# Patient Record
Sex: FEMALE | Race: WHITE | NOT HISPANIC OR LATINO | Employment: UNEMPLOYED | URBAN - NONMETROPOLITAN AREA
[De-identification: names, ages, dates, MRNs, and addresses within clinical notes are randomized per-mention and may not be internally consistent; named-entity substitution may affect disease eponyms.]

---

## 2018-02-02 ENCOUNTER — HOSPITAL ENCOUNTER (EMERGENCY)
Facility: HOSPITAL | Age: 18
Discharge: STILL ADMITTED OR ADMINISTRATIVE | End: 2018-02-03
Attending: FAMILY MEDICINE

## 2018-02-02 DIAGNOSIS — R45.851 SUICIDAL IDEATION: ICD-10-CM

## 2018-02-02 DIAGNOSIS — F32.1 MODERATE SINGLE CURRENT EPISODE OF MAJOR DEPRESSIVE DISORDER (HCC): Primary | ICD-10-CM

## 2018-02-02 LAB
6-ACETYL MORPHINE: NEGATIVE
ALBUMIN SERPL-MCNC: 5 G/DL (ref 3.5–5)
ALBUMIN/GLOB SERPL: 1.4 G/DL (ref 1.5–2.5)
ALP SERPL-CCNC: 105 U/L (ref 35–104)
ALT SERPL W P-5'-P-CCNC: 39 U/L (ref 10–36)
AMPHET+METHAMPHET UR QL: NEGATIVE
ANION GAP SERPL CALCULATED.3IONS-SCNC: 8 MMOL/L (ref 3.6–11.2)
AST SERPL-CCNC: 33 U/L (ref 10–30)
B-HCG UR QL: NEGATIVE
BARBITURATES UR QL SCN: NEGATIVE
BASOPHILS # BLD AUTO: 0.01 10*3/MM3 (ref 0–0.3)
BASOPHILS NFR BLD AUTO: 0.1 % (ref 0–2)
BENZODIAZ UR QL SCN: NEGATIVE
BILIRUB SERPL-MCNC: 0.3 MG/DL (ref 0.2–1.8)
BILIRUB UR QL STRIP: NEGATIVE
BUN BLD-MCNC: 13 MG/DL (ref 7–21)
BUN/CREAT SERPL: 15.1 (ref 7–25)
BUPRENORPHINE SERPL-MCNC: NEGATIVE NG/ML
CALCIUM SPEC-SCNC: 9.5 MG/DL (ref 7.7–10)
CANNABINOIDS SERPL QL: NEGATIVE
CHLORIDE SERPL-SCNC: 104 MMOL/L (ref 99–112)
CLARITY UR: CLEAR
CO2 SERPL-SCNC: 26 MMOL/L (ref 24.3–31.9)
COCAINE UR QL: NEGATIVE
COLOR UR: ABNORMAL
CREAT BLD-MCNC: 0.86 MG/DL (ref 0.43–1.29)
DEPRECATED RDW RBC AUTO: 43.9 FL (ref 37–54)
EOSINOPHIL # BLD AUTO: 0.06 10*3/MM3 (ref 0–0.7)
EOSINOPHIL NFR BLD AUTO: 0.5 % (ref 0–5)
ERYTHROCYTE [DISTWIDTH] IN BLOOD BY AUTOMATED COUNT: 13.8 % (ref 11.5–14.5)
ETHANOL BLD-MCNC: <10 MG/DL
ETHANOL UR QL: <0.01 %
GFR SERPL CREATININE-BSD FRML MDRD: 85 ML/MIN/1.73
GLOBULIN UR ELPH-MCNC: 3.6 GM/DL
GLUCOSE BLD-MCNC: 91 MG/DL (ref 70–110)
GLUCOSE UR STRIP-MCNC: NEGATIVE MG/DL
HCT VFR BLD AUTO: 41.7 % (ref 37–47)
HGB BLD-MCNC: 13.5 G/DL (ref 12–16)
HGB UR QL STRIP.AUTO: NEGATIVE
IMM GRANULOCYTES # BLD: 0.02 10*3/MM3 (ref 0–0.03)
IMM GRANULOCYTES NFR BLD: 0.2 % (ref 0–0.5)
KETONES UR QL STRIP: ABNORMAL
LEUKOCYTE ESTERASE UR QL STRIP.AUTO: NEGATIVE
LYMPHOCYTES # BLD AUTO: 2.04 10*3/MM3 (ref 1–3)
LYMPHOCYTES NFR BLD AUTO: 18.6 % (ref 21–51)
MCH RBC QN AUTO: 28.1 PG (ref 27–33)
MCHC RBC AUTO-ENTMCNC: 32.4 G/DL (ref 33–37)
MCV RBC AUTO: 86.9 FL (ref 80–94)
METHADONE UR QL SCN: NEGATIVE
MONOCYTES # BLD AUTO: 0.37 10*3/MM3 (ref 0.1–0.9)
MONOCYTES NFR BLD AUTO: 3.4 % (ref 0–10)
NEUTROPHILS # BLD AUTO: 8.45 10*3/MM3 (ref 1.4–6.5)
NEUTROPHILS NFR BLD AUTO: 77.2 % (ref 30–70)
NITRITE UR QL STRIP: NEGATIVE
OPIATES UR QL: NEGATIVE
OSMOLALITY SERPL CALC.SUM OF ELEC: 275.4 MOSM/KG (ref 273–305)
OXYCODONE UR QL SCN: NEGATIVE
PCP UR QL SCN: NEGATIVE
PH UR STRIP.AUTO: <=5 [PH] (ref 5–8)
PLATELET # BLD AUTO: 334 10*3/MM3 (ref 130–400)
PMV BLD AUTO: 9.7 FL (ref 6–10)
POTASSIUM BLD-SCNC: 4.1 MMOL/L (ref 3.5–5.3)
PROT SERPL-MCNC: 8.6 G/DL (ref 6–8)
PROT UR QL STRIP: NEGATIVE
RBC # BLD AUTO: 4.8 10*6/MM3 (ref 4.2–5.4)
SODIUM BLD-SCNC: 138 MMOL/L (ref 135–153)
SP GR UR STRIP: 1.02 (ref 1–1.03)
UROBILINOGEN UR QL STRIP: ABNORMAL
WBC NRBC COR # BLD: 10.95 10*3/MM3 (ref 4.5–12.5)

## 2018-02-02 PROCEDURE — 80307 DRUG TEST PRSMV CHEM ANLYZR: CPT | Performed by: NURSE PRACTITIONER

## 2018-02-02 PROCEDURE — 85025 COMPLETE CBC W/AUTO DIFF WBC: CPT | Performed by: NURSE PRACTITIONER

## 2018-02-02 PROCEDURE — 80053 COMPREHEN METABOLIC PANEL: CPT | Performed by: NURSE PRACTITIONER

## 2018-02-02 PROCEDURE — 81003 URINALYSIS AUTO W/O SCOPE: CPT | Performed by: NURSE PRACTITIONER

## 2018-02-02 PROCEDURE — 81025 URINE PREGNANCY TEST: CPT | Performed by: NURSE PRACTITIONER

## 2018-02-03 ENCOUNTER — HOSPITAL ENCOUNTER (INPATIENT)
Facility: HOSPITAL | Age: 18
LOS: 3 days | Discharge: HOME | End: 2018-02-06
Attending: PSYCHIATRY & NEUROLOGY | Admitting: PSYCHIATRY & NEUROLOGY

## 2018-02-03 VITALS
BODY MASS INDEX: 42.25 KG/M2 | HEART RATE: 94 BPM | HEIGHT: 59 IN | DIASTOLIC BLOOD PRESSURE: 90 MMHG | WEIGHT: 209.6 LBS | SYSTOLIC BLOOD PRESSURE: 144 MMHG | OXYGEN SATURATION: 100 % | TEMPERATURE: 98.6 F | RESPIRATION RATE: 18 BRPM

## 2018-02-03 PROBLEM — F32.A DEPRESSION WITH SUICIDAL IDEATION: Status: ACTIVE | Noted: 2018-02-03

## 2018-02-03 PROBLEM — R45.851 DEPRESSION WITH SUICIDAL IDEATION: Status: ACTIVE | Noted: 2018-02-03

## 2018-02-03 PROCEDURE — 93005 ELECTROCARDIOGRAM TRACING: CPT | Performed by: PSYCHIATRY & NEUROLOGY

## 2018-02-03 PROCEDURE — 99223 1ST HOSP IP/OBS HIGH 75: CPT | Performed by: PSYCHIATRY & NEUROLOGY

## 2018-02-03 RX ORDER — AMOXICILLIN 500 MG/1
1000 CAPSULE ORAL EVERY 12 HOURS SCHEDULED
Status: CANCELLED | OUTPATIENT
Start: 2018-02-03 | End: 2018-02-10

## 2018-02-03 RX ORDER — FAMOTIDINE 20 MG/1
20 TABLET, FILM COATED ORAL 2 TIMES DAILY PRN
Status: DISCONTINUED | OUTPATIENT
Start: 2018-02-03 | End: 2018-02-06 | Stop reason: HOSPADM

## 2018-02-03 RX ORDER — AMOXICILLIN 500 MG/1
1000 CAPSULE ORAL EVERY 12 HOURS SCHEDULED
Status: DISCONTINUED | OUTPATIENT
Start: 2018-02-03 | End: 2018-02-06 | Stop reason: HOSPADM

## 2018-02-03 RX ORDER — ECHINACEA PURPUREA EXTRACT 125 MG
2 TABLET ORAL AS NEEDED
Status: DISCONTINUED | OUTPATIENT
Start: 2018-02-03 | End: 2018-02-06 | Stop reason: HOSPADM

## 2018-02-03 RX ORDER — BENZTROPINE MESYLATE 1 MG/ML
0.5 INJECTION INTRAMUSCULAR; INTRAVENOUS DAILY PRN
Status: DISCONTINUED | OUTPATIENT
Start: 2018-02-03 | End: 2018-02-06 | Stop reason: HOSPADM

## 2018-02-03 RX ORDER — GUAIFENESIN 600 MG/1
1200 TABLET, EXTENDED RELEASE ORAL EVERY 12 HOURS SCHEDULED
Status: DISCONTINUED | OUTPATIENT
Start: 2018-02-03 | End: 2018-02-06 | Stop reason: HOSPADM

## 2018-02-03 RX ORDER — ACETAMINOPHEN 325 MG/1
650 TABLET ORAL EVERY 4 HOURS PRN
Status: DISCONTINUED | OUTPATIENT
Start: 2018-02-03 | End: 2018-02-06 | Stop reason: HOSPADM

## 2018-02-03 RX ORDER — TRAZODONE HYDROCHLORIDE 50 MG/1
50 TABLET ORAL NIGHTLY PRN
Status: DISCONTINUED | OUTPATIENT
Start: 2018-02-03 | End: 2018-02-06 | Stop reason: HOSPADM

## 2018-02-03 RX ORDER — BENZTROPINE MESYLATE 1 MG/1
1 TABLET ORAL DAILY PRN
Status: DISCONTINUED | OUTPATIENT
Start: 2018-02-03 | End: 2018-02-06 | Stop reason: HOSPADM

## 2018-02-03 RX ORDER — LOPERAMIDE HYDROCHLORIDE 2 MG/1
2 CAPSULE ORAL 4 TIMES DAILY PRN
Status: DISCONTINUED | OUTPATIENT
Start: 2018-02-03 | End: 2018-02-06 | Stop reason: HOSPADM

## 2018-02-03 RX ORDER — ALUMINA, MAGNESIA, AND SIMETHICONE 2400; 2400; 240 MG/30ML; MG/30ML; MG/30ML
15 SUSPENSION ORAL EVERY 6 HOURS PRN
Status: DISCONTINUED | OUTPATIENT
Start: 2018-02-03 | End: 2018-02-06 | Stop reason: HOSPADM

## 2018-02-03 RX ORDER — BENZONATATE 100 MG/1
100 CAPSULE ORAL 3 TIMES DAILY PRN
Status: DISCONTINUED | OUTPATIENT
Start: 2018-02-03 | End: 2018-02-06 | Stop reason: HOSPADM

## 2018-02-03 RX ORDER — HYDROXYZINE 50 MG/1
50 TABLET, FILM COATED ORAL EVERY 6 HOURS PRN
Status: DISCONTINUED | OUTPATIENT
Start: 2018-02-03 | End: 2018-02-06 | Stop reason: HOSPADM

## 2018-02-03 RX ORDER — AMOXICILLIN 875 MG/1
875 TABLET, COATED ORAL 2 TIMES DAILY
Status: ON HOLD | COMMUNITY
End: 2018-04-10

## 2018-02-03 RX ORDER — ONDANSETRON 4 MG/1
4 TABLET, FILM COATED ORAL EVERY 6 HOURS PRN
Status: DISCONTINUED | OUTPATIENT
Start: 2018-02-03 | End: 2018-02-06 | Stop reason: HOSPADM

## 2018-02-03 RX ORDER — CETIRIZINE HYDROCHLORIDE, PSEUDOEPHEDRINE HYDROCHLORIDE 5; 120 MG/1; MG/1
1 TABLET, FILM COATED, EXTENDED RELEASE ORAL 2 TIMES DAILY PRN
Status: DISCONTINUED | OUTPATIENT
Start: 2018-02-03 | End: 2018-02-06 | Stop reason: HOSPADM

## 2018-02-03 RX ADMIN — GUAIFENESIN 1200 MG: 600 TABLET, EXTENDED RELEASE ORAL at 17:00

## 2018-02-03 RX ADMIN — TRAZODONE HYDROCHLORIDE 50 MG: 50 TABLET ORAL at 02:02

## 2018-02-03 RX ADMIN — AMOXICILLIN 1000 MG: 500 CAPSULE ORAL at 13:48

## 2018-02-03 RX ADMIN — HYDROXYZINE HYDROCHLORIDE 50 MG: 50 TABLET ORAL at 02:26

## 2018-02-03 RX ADMIN — AMOXICILLIN 1000 MG: 500 CAPSULE ORAL at 21:52

## 2018-02-03 RX ADMIN — TRAZODONE HYDROCHLORIDE 50 MG: 50 TABLET ORAL at 21:52

## 2018-02-03 NOTE — NURSING NOTE
Order for Emergency Appointment of Fiduciary - October 6, 2017 - [facility] [address] - Case Number: [id number].

## 2018-02-03 NOTE — ED PROVIDER NOTES
Subjective   Patient is a 17 y.o. female presenting with mental health disorder.   History provided by:  Patient   used: No    Mental Health Problem   Presenting symptoms: depression, suicidal thoughts and suicidal threats    Degree of incapacity (severity):  Moderate  Onset quality:  Gradual  Timing:  Intermittent  Progression:  Worsening  Chronicity:  New  Treatment compliance:  Untreated  Relieved by:  Nothing  Worsened by:  Nothing  Ineffective treatments:  None tried  Associated symptoms: anxiety, feelings of worthlessness and irritability    Associated symptoms: no abdominal pain and no anhedonia        Review of Systems   Constitutional: Positive for irritability.   HENT: Negative.    Eyes: Negative.    Respiratory: Negative.    Cardiovascular: Negative.    Gastrointestinal: Negative.  Negative for abdominal pain.   Endocrine: Negative.    Genitourinary: Negative.    Musculoskeletal: Negative.    Skin: Negative.    Allergic/Immunologic: Negative.    Neurological: Negative.    Hematological: Negative.    Psychiatric/Behavioral: Positive for suicidal ideas. The patient is nervous/anxious.    All other systems reviewed and are negative.      No past medical history on file.    No Known Allergies    No past surgical history on file.    No family history on file.    Social History     Social History   • Marital status: Single     Spouse name: N/A   • Number of children: N/A   • Years of education: N/A     Social History Main Topics   • Smoking status: Not on file   • Smokeless tobacco: Not on file   • Alcohol use Not on file   • Drug use: Not on file   • Sexual activity: Not on file     Other Topics Concern   • Not on file     Social History Narrative           Objective   Physical Exam   Constitutional: She is oriented to person, place, and time. She appears well-developed and well-nourished.   HENT:   Head: Normocephalic and atraumatic.   Eyes: EOM are normal. Pupils are equal, round, and  reactive to light.   Neck: Normal range of motion.   Cardiovascular: Normal rate, regular rhythm, normal heart sounds and intact distal pulses.    Pulmonary/Chest: Effort normal and breath sounds normal.   Abdominal: Soft. Bowel sounds are normal.   Musculoskeletal: Normal range of motion.   Neurological: She is alert and oriented to person, place, and time.   Skin: Skin is warm and dry.   Psychiatric:   Flat affect, appears anxious and tearful. Says she has intermittent suicidal ideation and current suicidal ideation and depression   Nursing note and vitals reviewed.      Procedures         ED Course  ED Course                  MDM  Number of Diagnoses or Management Options  Moderate single current episode of major depressive disorder: new and requires workup  Suicidal ideation: new and requires workup     Amount and/or Complexity of Data Reviewed  Clinical lab tests: ordered and reviewed  Tests in the medicine section of CPT®: reviewed and ordered    Risk of Complications, Morbidity, and/or Mortality  Presenting problems: moderate  Diagnostic procedures: moderate  Management options: moderate    Patient Progress  Patient progress: improved      Final diagnoses:   Moderate single current episode of major depressive disorder   Suicidal ideation            Isaiah Pagan, APRN  02/03/18 0106

## 2018-02-03 NOTE — NURSING NOTE
Dr. Ram notified of intake assessment, laboratory results, verbalizes understanding and reports new order to admit (Adult Psychiatry), routine orders and SP3 Precautions.  RBTO x 2.

## 2018-02-03 NOTE — NURSING NOTE
Queta Khan (Mother/Guardian) verbalizes consent for psychiatric evaluation, laboratory testing, admission and all medication administration.

## 2018-02-03 NOTE — H&P
"INITIAL PSYCHIATRIC HISTORY & PHYSICAL    Patient Identification:  Name:  Rosa Castro  Age:  17 y.o.  Sex:  female  :  2000  MRN:  [id number]  Visit Number:  [id number]  Primary Care Physician:  Sinan Perez MD    SUBJECTIVE    CC: \" My depression is through the roof.\"    HPI: Rosa Castro is a 17 y.o. female who was admitted on 2/3/2018 with complaints of suicidal thoughts. \" I have suicide thoughts all the time, I stay depressed a lot were my ma-maw , I have real bad anxiety.\" Pt reports her anxiety 8/10 and her depression \" It is a 10 plus, it is through the roof.\" Pt reports increasing depression for the past 6 months.Pt reports decreased sleep, not sleeping for up to 3 days. Pt reports decreased appetite. Pt reports, \"I need help, to not feel so crazy.\" Pt denies any current or hx of A/V/H. Pt presents with impairment to intellect. When pt is questioned about specifics of her care, medications or past behavior of s/s of derek pt states, \" you can ask my Mom.\"    Patient's family reports that she has become increasingly hypersexual, mom reports that she has to hide the shampoo bottles because she uses them on her self for sex, she gets on the Internet and hooks up with men, mom reports she got up during the night to find her daughter outside in a car having sex with a man, she reports patient having sex in the [employer] parking lot and being left there by the man. Family reports an increase in unsafe behavior in meeting men away from home.   It is reported by Mother that pt has a court date for a review of guardianship. Pt denies any hx of abuse, denies neglect, denies HI, denies any legal implications.    PAST PSYCHIATRIC HX: Pt and Mother report that pt has not received outpatient or inpatient tx and/or medications. Pt has a scheduled initial visit for outpatient tx.    SUBSTANCE USE HX: Pt denies any hx or current use of illicit drugs or alcohol.    SOCIAL HX: Pt's Mother has " temporary guardianship and pts Mother is applying for permanent guardianship of pt. Pt moved in with her Mother over 1 year ago after the pts Grandmother passed away. Pt reports that she had always lived with her Grandparents before then. Pt reports that she lives with her Mother, Father, Brothers and Sisters currently. Pt reports that she had a good supportive childhood with her Grandparents.     History reviewed. No pertinent past medical history.    No past surgical history on file.    History reviewed. No pertinent family history.      Prescriptions Prior to Admission   Medication Sig Dispense Refill Last Dose   • amoxicillin (AMOXIL) 875 MG tablet Take 875 mg by mouth 2 (Two) Times a Day. For 10 days starting 1/30/18 2/2/2018 at am   • loratadine-pseudoephedrine (CLARITIN-D 12-hour) 5-120 MG per 12 hr tablet Take 1 tablet by mouth 2 (Two) Times a Day.   2/2/2018 at AM       Reviewed available past medical and psychiatric records.    ALLERGIES:  Review of patient's allergies indicates no known allergies.    Temp:  [98 °F (36.7 °C)-98.8 °F (37.1 °C)] 98.8 °F (37.1 °C)  Heart Rate:  [67-98] 89  Resp:  [18] 18  BP: (136-152)/() 144/95    REVIEW OF SYSTEMS:  Review of Systems   Constitutional: Positive for fatigue. Negative for chills and diaphoresis.   HENT: Positive for congestion and postnasal drip.    Eyes: Negative.    Respiratory: Positive for cough. Negative for choking, chest tightness and shortness of breath.    Cardiovascular: Negative.    Gastrointestinal: Negative.    Endocrine: Negative.    Genitourinary: Negative.    Musculoskeletal: Negative.    Skin: Negative.    Allergic/Immunologic: Negative.    Neurological: Negative.    Hematological: Negative.       See HPI for psychiatric ROS  OBJECTIVE    PHYSICAL EXAM:  Physical Exam   Constitutional: She is oriented to person, place, and time. She appears well-developed and well-nourished.   HENT:   Head: Normocephalic and atraumatic.   Nose: Nose  normal.   Mouth/Throat: Oropharynx is clear and moist.   Eyes: Conjunctivae and EOM are normal. Pupils are equal, round, and reactive to light. Right eye exhibits no discharge. Left eye exhibits no discharge. No scleral icterus.   Neck: Normal range of motion. Neck supple. No JVD present. No thyromegaly present.   Cardiovascular: Normal rate, regular rhythm and normal heart sounds.  Exam reveals no gallop and no friction rub.    No murmur heard.  Pulmonary/Chest: Effort normal and breath sounds normal. No respiratory distress. She has no wheezes.   Abdominal: Soft. Bowel sounds are normal. She exhibits no distension and no mass. There is no rebound and no guarding.   Musculoskeletal: Normal range of motion. She exhibits no edema, tenderness or deformity.   Lymphadenopathy:     She has no cervical adenopathy.   Neurological: She is alert and oriented to person, place, and time. No cranial nerve deficit. She exhibits normal muscle tone. Coordination normal.   Skin: Skin is warm and dry. No rash noted. No erythema. No pallor.       MENTAL STATUS EXAM:               Hygiene:   good  Cooperation:  Cooperative  Eye Contact:  Good  Psychomotor Behavior:  Appropriate  Affect:  Appropriate  Hopelessness: 5  Speech:  Normal  Thought Progress:  Linear  Thought Content:  Mood congurent  Suicidal:  Suicidal Ideation  Homicidal:  None  Hallucinations:  None  Delusion:  None  Memory:  Deficits  Orientation:  Person, Place, Time and Situation  Reliability:  fair  Insight:  Fair  Judgement:  Fair  Impulse Control:  Poor      Imaging Results (last 24 hours)     ** No results found for the last 24 hours. **           ECG/EMG Results (most recent)     Procedure Component Value Units Date/Time    ECG 12 Lead [[id number]] Collected:  02/03/18 0257     Updated:  02/03/18 0300    Narrative:       Test Reason : Admission Protocol.  Blood Pressure : **/** mmHG  Vent. Rate : 076 BPM     Atrial Rate : 076 BPM     P-R Int : 130 ms          QRS  Dur : 088 ms      QT Int : 402 ms       P-R-T Axes : 045 018 025 degrees     QTc Int : 452 ms    Normal sinus rhythm  Minimal voltage criteria for LVH, may be normal variant  Borderline ECG  No previous ECGs available    Referred By:             Confirmed By:            Lab Results   Component Value Date    GLUCOSE 91 02/02/2018    BUN 13 02/02/2018    CREATININE 0.86 02/02/2018    EGFRIFNONA 85 02/02/2018    BCR 15.1 02/02/2018    CO2 26.0 02/02/2018    CALCIUM 9.5 02/02/2018    ALBUMIN 5.00 02/02/2018    LABIL2 1.4 (L) 02/02/2018    AST 33 (H) 02/02/2018    ALT 39 (H) 02/02/2018       Lab Results   Component Value Date    WBC 10.95 02/02/2018    HGB 13.5 02/02/2018    HCT 41.7 02/02/2018    MCV 86.9 02/02/2018     02/02/2018       Pain Management Panel     Pain Management Panel Latest Ref Rng & Units 2/2/2018    AMPHETAMINES SCREEN, URINE Negative Negative    BARBITURATES SCREEN Negative Negative    BENZODIAZEPINE SCREEN, URINE Negative Negative    BUPRENORPHINE Negative Negative    COCAINE SCREEN, URINE Negative Negative    METHADONE SCREEN, URINE Negative Negative          Brief Urine Lab Results  (Last result in the past 365 days)      Color   Clarity   Blood   Leuk Est   Nitrite   Protein   CREAT   Urine HCG        02/02/18 2311 Dark Yellow(A) Clear Negative Negative Negative Negative         02/02/18 2311               Negative           Reviewed labs and studies done with this admission.       ASSESSMENT & PLAN:      Patient Active Problem List   Diagnosis Code   • Depression with suicidal ideation F32.9, R45.851     Rule out Bipolar Depression.    The patient has been admitted for safety and stabilization.  Patient will be monitored for suicidality daily and maintained on Suicide precaution Level 3 (q15 min checks) .  The patient will have individual and group therapy with a master's level therapist. A master treatment plan will be developed and agreed upon by the patient and his/her treatment team.   The patient's estimated length of stay in the hospital is 5-7 days.     Patient is not a good historian.  We need to to gather collateral from family order to establish a clear differential diagnosis and determine whether or not she has bipolar disorder versus unipolar depression prior to initiating antidepressant medication.    This note was generated using a scribe, Sandra Torres. The work documented in this note was completed, reviewed, and approved by the attending psychiatrist as designated Dr. ALEXIS francois.

## 2018-02-03 NOTE — PLAN OF CARE
Problem: BH Patient Care Overview (Adult)  Goal: Plan of Care Review  Outcome: Ongoing (interventions implemented as appropriate)

## 2018-02-03 NOTE — PLAN OF CARE
Problem: BH Patient Care Overview (Adult)  Goal: Plan of Care Review  Outcome: Ongoing (interventions implemented as appropriate)   02/03/18 1712   Coping/Psychosocial Response Interventions   Plan Of Care Reviewed With patient   Coping/Psychosocial   Patient Agreement with Plan of Care agrees   Outcome Evaluation   Outcome Summary/Follow up Plan Pt cooperative with staff. Pt rates anxiety 8/10 and depression 10/10. Pt denies SI/HI/BALA.    Patient Care Overview   Progress progress toward functional goals is gradual

## 2018-02-03 NOTE — PLAN OF CARE
"Problem: BH Patient Care Overview (Adult)  Goal: Plan of Care Review  Outcome: Ongoing (interventions implemented as appropriate)   02/03/18 0901   Coping/Psychosocial Response Interventions   Plan Of Care Reviewed With patient;guardian   Coping/Psychosocial   Patient Agreement with Plan of Care agrees   Outcome Evaluation   Outcome Summary/Follow up Plan Therapist met with new admit for initial evaluation    Patient Care Overview   Consent Given to Review Plan with Guardian Nita Khan    Progress progress toward functional goals is gradual         Goal: Interdisciplinary Rounds/Family Conference  Outcome: Ongoing (interventions implemented as appropriate)   02/03/18 0901   Interdisciplinary Rounds/Family Conf   Summary Treatment team staffing and patient evaluation    Participants psychiatrist;social work;nursing;patient         Goal: Individualization and Mutuality  Outcome: Ongoing (interventions implemented as appropriate)   02/03/18 0815 02/03/18 0901   Behavioral Health Screens   Patient Personal Strengths family/social support;expressive of emotions;expressive of needs;motivated for recovery;motivated for treatment;positive educational history;realistic evaluation of current/future capabilities;resourceful;stable living environment;spiritual/Buddhism support --    Patient Personal Strengths Comment --  Zacn, willing to seek help    Patient Vulnerabilities --  Ineffective coping, intellectual disability   Individualization   Patient Specific Preferences --  None    Patient Specific Goals --  \"Help with depression.\"    Patient Specific Interventions --  Therapist will offer 1-4 individual, family education, aftercare planning    Mutuality/Individual Preferences   What Anxieties, Fears or Concerns Do You Have About Your Health or Care? --  \"I want out of this place.\"    What Questions Do You Have About Your Health or Care? --  None    What Information Would Help Us Give You More Personalized Care? --  " None          Goal: Discharge Needs Assessment  Outcome: Ongoing (interventions implemented as appropriate)   02/03/18 0901   Discharge Needs Assessment   Concerns To Be Addressed coping/stress concerns;cognitive/perceptual concerns;suicidal concerns   Readmission Within The Last 30 Days no previous admission in last 30 days   Community Agency Name(S) [facility]    Current Discharge Risk psychiatric illness   Discharge Planning Comments Patient will return home with guardian. Guardian agreeable to [facility] referral. Patient has insurance for discharge medications.    Discharge Needs Assessment   Outpatient/Agency/Support Group Needs outpatient medication management;outpatient psychiatric care (specify);outpatient counseling   Anticipated Discharge Disposition home or self-care   Discharge Disposition home with family   Living Environment   Transportation Available family or friend will provide       0810:       DATA:       Therapist met individually with patient this date to introduce role and to discuss hospitalization expectations. Patient agreeable.     Therapist provided emotional support and education this date.   Discussed the therapist/patient relationship and explain the parameters and limitations of relative confidentiality.  Also discussed the importance active participation, and honesty to the treatment process.  Encouraged patient to utilize individual sessions to discuss/vent their feelings, frustrations, and fears.      Therapist completed integrated summary, treatment plan, and initiated social history this date.  Therapist is strongly recommending a family session prior to discharge.  Therapist spoke with patient's guardian Nita who is supportive. She reports that patient is hyper-sexual and these behaviors have gotten worse over the past year.  Guardian appears supportive of patient getting help and has her established with [facility].        ASSESSMENT:     Ms. Rosa Castro is a 17 year  old , single, disabled female.  Patient required admission due to suicidal ideation.  Patient is highschool educated female. She is impacted by intellectual disability. She is currently disabled and lives with her mother who is guardian.  Patient was brought to the hospital due to suicial ideation and recent risky behavior. Patient's mother reports that she has been hypersexual.  Meeting men off the internet and sneaking to have sex with them. Was last caught with an unknown may 2 months ago.  She is also overtly masturbating and difficult to redirect.  Patient has been attending [facility] and will have upcoming court date to review guardianship.       PLAN:      Treatment team will focus efforts on stabilizing patient's acute symptoms while providing education on healthy coping and crisis management to reduce hospitalizations.   Patient requires daily psychiatrist evaluation and 24/7 nursing supervision to promote patient  safety.    Therapist will offer 1-4 individual sessions (20-30 minutes each), 1 therapy group daily, family education, and appropriate referral.    Therapist recommends outpatient psychiatric referral. Patient's guardian has provided verbal consent for [facility].

## 2018-02-03 NOTE — NURSING NOTE
Carline RN - Lead notified of new order to admit (Dr. Ram), insurance ([place] Medicaid), verbalizes understanding and reports bed assignment (39 A).

## 2018-02-03 NOTE — PLAN OF CARE
Problem: BH Patient Care Overview (Adult)  Goal: Plan of Care Review  Outcome: Ongoing (interventions implemented as appropriate)   02/03/18 0341   Coping/Psychosocial Response Interventions   Plan Of Care Reviewed With patient   Coping/Psychosocial   Patient Agreement with Plan of Care agrees   Outcome Evaluation   Outcome Summary/Follow up Plan New patient

## 2018-02-04 PROCEDURE — 99232 SBSQ HOSP IP/OBS MODERATE 35: CPT | Performed by: PSYCHIATRY & NEUROLOGY

## 2018-02-04 RX ADMIN — GUAIFENESIN 1200 MG: 600 TABLET, EXTENDED RELEASE ORAL at 08:24

## 2018-02-04 RX ADMIN — AMOXICILLIN 1000 MG: 500 CAPSULE ORAL at 08:24

## 2018-02-04 RX ADMIN — TRAZODONE HYDROCHLORIDE 50 MG: 50 TABLET ORAL at 20:54

## 2018-02-04 RX ADMIN — HYDROXYZINE HYDROCHLORIDE 50 MG: 50 TABLET ORAL at 16:41

## 2018-02-04 RX ADMIN — GUAIFENESIN 1200 MG: 600 TABLET, EXTENDED RELEASE ORAL at 20:54

## 2018-02-04 RX ADMIN — AMOXICILLIN 1000 MG: 500 CAPSULE ORAL at 20:54

## 2018-02-04 NOTE — PLAN OF CARE
Problem: BH Patient Care Overview (Adult)  Goal: Plan of Care Review  Outcome: Ongoing (interventions implemented as appropriate)   02/04/18 6402   Coping/Psychosocial Response Interventions   Plan Of Care Reviewed With patient   Coping/Psychosocial   Patient Agreement with Plan of Care agrees   Outcome Evaluation   Outcome Summary/Follow up Plan Pt frequently seeks out staff. Pt rates anxiety/depression 4/10.   Patient Care Overview   Progress no change

## 2018-02-04 NOTE — PROGRESS NOTES
"      Inpatient Psy Progress Note   Clinician: Isaiah Ram MD  Admission Date: 2/3/2018  10:04 AM 02/04/18    Behavioral Health Treatment Plan and Problem List: I have reviewed and approved the Behavioral Health Treatment Plan and Problem list.    Allergies  No Known Allergies    Hospital Day: 1 day      Assessment completed within view of staff    History  CC: inpatient followup  Interval HPI: Patient seen and evaluated by me.  Chart reviewed.  Patient has been rating her level of depression at a 10/10 during interviews with staff, and her level of anxiety at 8/10.   Denies SI this morning.  Tolerating meds okay.     Interval Review of Systems:   General ROS: negative for - fever or malaise  Endocrine ROS: negative for - palpitations  Respiratory ROS: no cough, shortness of breath, or wheezing  Cardiovascular ROS: no chest pain or dyspnea on exertion  Gastrointestinal ROS: no abdominal pain,no black or bloody stools    /80 (BP Location: Right arm, Patient Position: Sitting)  Pulse 79  Temp 97.2 °F (36.2 °C) (Temporal Artery )   Resp 16  Ht 149.9 cm (59\")  Wt 93.4 kg (205 lb 12.8 oz)  LMP 02/01/2018  SpO2 99%  BMI 41.57 kg/m2    Mental Status Exam  Mood: anxious  Affect: mood-congruent   Thought Processes: linear, logical, and goal directed  Thought Content: normal  Hallucinations: no  Suicidal Thoughts: denies  Suicidal Plan/Intent:denies  Hopelesness:Mild  Homicidal Thoughts:  absent      Medical Decision Making:   Labs:     Lab Results (last 24 hours)     ** No results found for the last 24 hours. **            Radiology:     Imaging Results (last 24 hours)     ** No results found for the last 24 hours. **            EKG:     ECG/EMG Results (most recent)     Procedure Component Value Units Date/Time    ECG 12 Lead [[id number]] Collected:  02/03/18 0257     Updated:  02/03/18 0824    Narrative:       Test Reason : Admission Protocol.  Blood Pressure : **/** mmHG  Vent. Rate : 076 BPM     Atrial " Rate : 076 BPM     P-R Int : 130 ms          QRS Dur : 088 ms      QT Int : 402 ms       P-R-T Axes : 045 018 025 degrees     QTc Int : 452 ms    Normal sinus rhythm  Minimal voltage criteria for LVH, may be normal variant  Borderline ECG  No previous ECGs available  Confirmed by Sally Espinoza ([id number]) on 2/3/2018 8:24:00 AM    Referred By:             Confirmed By:Sally Espinoza           Medications:     amoxicillin 1,000 mg Oral Q12H   guaiFENesin 1,200 mg Oral Q12H          All medications reviewed.      Assessment and Plan:    Active Problems:    Depression with suicidal ideation  Patient is not a good historian.  We need to to gather collateral from family order to establish a clear differential diagnosis and determine whether or not she has bipolar disorder versus unipolar depression prior to initiating antidepressant medication.    At this point she appears to be benefiting from individual and group psychotherapy on the unit.    Continue hospitalization for safety and stabilization.  Continue current level of Special Precautions (q15 minute checks).

## 2018-02-05 PROBLEM — F63.9 IMPULSE CONTROL DISORDER: Status: ACTIVE | Noted: 2018-02-05

## 2018-02-05 PROBLEM — F79 INTELLECTUAL DISABILITY: Status: ACTIVE | Noted: 2018-02-05

## 2018-02-05 PROCEDURE — 99232 SBSQ HOSP IP/OBS MODERATE 35: CPT | Performed by: PSYCHIATRY & NEUROLOGY

## 2018-02-05 RX ADMIN — HYDROXYZINE HYDROCHLORIDE 50 MG: 50 TABLET ORAL at 20:09

## 2018-02-05 RX ADMIN — HYDROXYZINE HYDROCHLORIDE 50 MG: 50 TABLET ORAL at 08:01

## 2018-02-05 RX ADMIN — AMOXICILLIN 1000 MG: 500 CAPSULE ORAL at 08:01

## 2018-02-05 RX ADMIN — GUAIFENESIN 1200 MG: 600 TABLET, EXTENDED RELEASE ORAL at 20:09

## 2018-02-05 RX ADMIN — HYDROXYZINE HYDROCHLORIDE 50 MG: 50 TABLET ORAL at 14:16

## 2018-02-05 RX ADMIN — TRAZODONE HYDROCHLORIDE 50 MG: 50 TABLET ORAL at 21:29

## 2018-02-05 RX ADMIN — AMOXICILLIN 1000 MG: 500 CAPSULE ORAL at 20:09

## 2018-02-05 RX ADMIN — GUAIFENESIN 1200 MG: 600 TABLET, EXTENDED RELEASE ORAL at 08:01

## 2018-02-05 NOTE — PLAN OF CARE
Problem: BH Patient Care Overview (Adult)  Goal: Plan of Care Review  Outcome: Ongoing (interventions implemented as appropriate)   02/05/18 8545   Coping/Psychosocial Response Interventions   Plan Of Care Reviewed With patient   Coping/Psychosocial   Patient Agreement with Plan of Care agrees   Outcome Evaluation   Outcome Summary/Follow up Plan Denies anxiety and depression, denies si/hi/maciel, no changes, no new orders.    Patient Care Overview   Progress no change
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
## 2018-02-05 NOTE — PLAN OF CARE
Problem:  Patient Care Overview (Adult)  Goal: Interdisciplinary Rounds/Family Conference  Outcome: Ongoing (interventions implemented as appropriate)   02/05/18 1319   Interdisciplinary Rounds/Family Conf   Summary Spoke with Dr. Cervantes regarding patient, met with patient and contacted patients guardian/mother.   Participants family;social work;patient;psychiatrist     DATA: Met with patient this morning and she reported that she wanted to get out of here.  She later reported that she would stay as long as she could eat chicken strips.  She reported that she was feeling suicidal but reports today that she is no longer feeling suicidal.  Spoke with Dr. eCrvantes who asked for this therapist to contact patients mother regarding medications and IQ, Dr. Cervantes discussed that he plans for patient to try Tenex.  Contacted patients mother and she reported that patient had an IEP in school but was not IQ tested.  Patients mother reported that patient was prescribed Adderall, Ativan and Celexa for a few months from Dr. Stein in [facility] but he is no longer prescribing patients medication but she reported that these medications did work for patient.  Patients mother was agreeable for patient to try Tenex.  Patients mother is agreeable for patient to discharge tomorrow.    ASSESSMENT:  Patient denies suicidal ideation today.  She reports some ongoing sadness with the loss of her grandmother and her grandfather in a nursing home.      PLAN:  Patient will continue stabilization.  Patient will return home upon stabilization and will follow up with [place] [facility].

## 2018-02-05 NOTE — DISCHARGE INSTR - APPOINTMENTS
[facility]  [address] [place] [address]  Ph- [phone]    Appt date and time: February 7th @ 11:30am    Other resources for day program. Patient can ask about being assessed on Feb 7th for [place] or ADT. Lizet Pollard is over the ADT program- [phone]  [place] program   [phone]

## 2018-02-06 VITALS
SYSTOLIC BLOOD PRESSURE: 115 MMHG | DIASTOLIC BLOOD PRESSURE: 84 MMHG | OXYGEN SATURATION: 97 % | RESPIRATION RATE: 20 BRPM | HEART RATE: 86 BPM | BODY MASS INDEX: 41.49 KG/M2 | TEMPERATURE: 96.9 F | WEIGHT: 205.8 LBS | HEIGHT: 59 IN

## 2018-02-06 PROBLEM — R45.851 DEPRESSION WITH SUICIDAL IDEATION: Status: RESOLVED | Noted: 2018-02-03 | Resolved: 2018-02-06

## 2018-02-06 PROBLEM — J06.9 URI (UPPER RESPIRATORY INFECTION): Status: ACTIVE | Noted: 2018-02-06

## 2018-02-06 PROBLEM — F32.A DEPRESSION WITH SUICIDAL IDEATION: Status: RESOLVED | Noted: 2018-02-03 | Resolved: 2018-02-06

## 2018-02-06 PROCEDURE — 99238 HOSP IP/OBS DSCHRG MGMT 30/<: CPT | Performed by: PSYCHIATRY & NEUROLOGY

## 2018-02-06 RX ORDER — GUANFACINE 1 MG/1
1 TABLET ORAL 2 TIMES DAILY
Qty: 60 TABLET | Refills: 0 | Status: SHIPPED | OUTPATIENT
Start: 2018-02-06 | End: 2018-04-13 | Stop reason: HOSPADM

## 2018-02-06 RX ORDER — GUANFACINE 1 MG/1
1 TABLET ORAL 2 TIMES DAILY
Status: DISCONTINUED | OUTPATIENT
Start: 2018-02-06 | End: 2018-02-06 | Stop reason: HOSPADM

## 2018-02-06 RX ADMIN — HYDROXYZINE HYDROCHLORIDE 50 MG: 50 TABLET ORAL at 08:04

## 2018-02-06 RX ADMIN — GUANFACINE 1 MG: 1 TABLET ORAL at 12:55

## 2018-02-06 RX ADMIN — GUAIFENESIN 1200 MG: 600 TABLET, EXTENDED RELEASE ORAL at 08:04

## 2018-02-06 RX ADMIN — AMOXICILLIN 1000 MG: 500 CAPSULE ORAL at 08:04

## 2018-02-06 NOTE — DISCHARGE SUMMARY
"      PSYCHIATRIC DISCHARGE SUMMARY     Patient Identification:  Name:  Rosa Castro  Age:  17 y.o.  Sex:  female  :  2000  MRN:  [id number]  Visit Number:  [id number]      Date of Admission:2/3/2018   Date of Discharge:  2018    Discharge Diagnosis:  Principal Problem:    Impulse control disorder  Active Problems:    Intellectual disability    URI (upper respiratory infection)        Admission Diagnosis:  Depression with suicidal ideation [F32.9, R45.851]     Hospital Course  Patient is a 17 y.o. female presented with depression and suicidal thoughts.  According to the family, she's had increased depression for the past several months and lately had more difficulty with sleep.  After her first day, the patient reported sleeping well and was insistent on leaving.  She has an intellectual disability and her mother has emergency guardianship.  Her mother discussed other behaviors that were concerning including eating hypersexual, trying to meet men online, having other unsafe sexual behavior.  On the unit, she was talkative and intrusive but not pressured.  She did not exhibit flight of ideas or other manic symptoms.  On the unit she slept well.  She was started on Tenex 1 mg twice a day for impulse control related to intellectual disability.  There still some concern about a mood disorder as well which will need to be monitored however, after further discussion with the family, the sexually impulsive behavior did not coincide with other symptoms of derek.  It was felt that many of her impulse control issues were related to poor judgment and insight related to her low cognitive functioning.  We referred the patient to comprehensive care and in particular there programs for intellectually disabled individuals for follow-up.      Mental Status Exam upon discharge:   Mood \"great\"   Affect-congruent, appropriate, stable  Thought Content-goal directed, no delusional material present  Thought " process-linear, organized.  Suicidality: No SI  Homicidality: No HI  Perception: No AH/VH    Procedures Performed         Consults:   Consults     No orders found from 1/5/2018 to 2/4/2018.          Pertinent Test Results:   CMP significant for an ALT of 39, AST of 33, CBC unremarkable, UA unremarkable.  UDS was negative.  Urine pregnancy was negative  Condition on Discharge:  stable    Vital Signs  Temp:  [96.9 °F (36.1 °C)] 96.9 °F (36.1 °C)  Heart Rate:  [] 86  Resp:  [18-20] 20  BP: (115-134)/(84-85) 115/84      Discharge Disposition:  Home or Self Care    Discharge Medications:   Rosa Castro   Home Medication Instructions JESE:[id number]    Printed on:02/06/18 1105   Medication Information                      amoxicillin (AMOXIL) 875 MG tablet  Take 875 mg by mouth 2 (Two) Times a Day. For 10 days starting 1/30/18             guanFACINE (TENEX) 1 MG tablet  Take 1 tablet by mouth 2 (Two) Times a Day.             loratadine-pseudoephedrine (CLARITIN-D 12-hour) 5-120 MG per 12 hr tablet  Take 1 tablet by mouth 2 (Two) Times a Day.                 Discharge Diet: regular     Activity at Discharge: as tolerated    Follow-up Appointments  [facility] in [place]    Test Results Pending at Discharge      Clinician:   Gregg Cervantes MD  02/06/18  11:06 AM

## 2018-02-06 NOTE — PLAN OF CARE
Problem: BH Patient Care Overview (Adult)  Goal: Plan of Care Review  Outcome: Ongoing (interventions implemented as appropriate)   02/06/18 0233   Coping/Psychosocial Response Interventions   Plan Of Care Reviewed With patient   Coping/Psychosocial   Patient Agreement with Plan of Care agrees   Outcome Evaluation   Outcome Summary/Follow up Plan no changes, no new orders.    Patient Care Overview   Progress no change

## 2018-04-10 ENCOUNTER — HOSPITAL ENCOUNTER (EMERGENCY)
Facility: HOSPITAL | Age: 18
Discharge: STILL ADMITTED OR ADMINISTRATIVE | End: 2018-04-10
Attending: EMERGENCY MEDICINE

## 2018-04-10 ENCOUNTER — HOSPITAL ENCOUNTER (INPATIENT)
Facility: HOSPITAL | Age: 18
LOS: 3 days | Discharge: HOME | End: 2018-04-13
Attending: PSYCHIATRY & NEUROLOGY | Admitting: PSYCHIATRY & NEUROLOGY

## 2018-04-10 VITALS
HEIGHT: 59 IN | WEIGHT: 203 LBS | OXYGEN SATURATION: 98 % | DIASTOLIC BLOOD PRESSURE: 90 MMHG | HEART RATE: 83 BPM | SYSTOLIC BLOOD PRESSURE: 140 MMHG | RESPIRATION RATE: 18 BRPM | BODY MASS INDEX: 40.92 KG/M2 | TEMPERATURE: 98.6 F

## 2018-04-10 DIAGNOSIS — R45.851 DEPRESSION WITH SUICIDAL IDEATION: Primary | ICD-10-CM

## 2018-04-10 DIAGNOSIS — F32.A DEPRESSION WITH SUICIDAL IDEATION: Primary | ICD-10-CM

## 2018-04-10 DIAGNOSIS — N39.0 URINARY TRACT INFECTION WITHOUT HEMATURIA, SITE UNSPECIFIED: ICD-10-CM

## 2018-04-10 LAB
6-ACETYL MORPHINE: NEGATIVE
ALBUMIN SERPL-MCNC: 4.5 G/DL (ref 3.5–5)
ALBUMIN/GLOB SERPL: 1.2 G/DL (ref 1.5–2.5)
ALP SERPL-CCNC: 94 U/L (ref 35–104)
ALT SERPL W P-5'-P-CCNC: 40 U/L (ref 10–36)
AMPHET+METHAMPHET UR QL: NEGATIVE
ANION GAP SERPL CALCULATED.3IONS-SCNC: 9.5 MMOL/L (ref 3.6–11.2)
AST SERPL-CCNC: 33 U/L (ref 10–30)
B-HCG UR QL: NEGATIVE
BACTERIA UR QL AUTO: ABNORMAL /HPF
BARBITURATES UR QL SCN: NEGATIVE
BASOPHILS # BLD AUTO: 0.01 10*3/MM3 (ref 0–0.3)
BASOPHILS NFR BLD AUTO: 0.1 % (ref 0–2)
BENZODIAZ UR QL SCN: NEGATIVE
BILIRUB SERPL-MCNC: 0.6 MG/DL (ref 0.2–1.8)
BILIRUB UR QL STRIP: ABNORMAL
BUN BLD-MCNC: 12 MG/DL (ref 7–21)
BUN/CREAT SERPL: 14.1 (ref 7–25)
BUPRENORPHINE SERPL-MCNC: NEGATIVE NG/ML
CALCIUM SPEC-SCNC: 9.8 MG/DL (ref 7.7–10)
CANNABINOIDS SERPL QL: NEGATIVE
CHLORIDE SERPL-SCNC: 108 MMOL/L (ref 99–112)
CLARITY UR: ABNORMAL
CO2 SERPL-SCNC: 23.5 MMOL/L (ref 24.3–31.9)
COCAINE UR QL: NEGATIVE
COLOR UR: ABNORMAL
CREAT BLD-MCNC: 0.85 MG/DL (ref 0.43–1.29)
DEPRECATED RDW RBC AUTO: 44.5 FL (ref 37–54)
EOSINOPHIL # BLD AUTO: 0.03 10*3/MM3 (ref 0–0.7)
EOSINOPHIL NFR BLD AUTO: 0.4 % (ref 0–5)
ERYTHROCYTE [DISTWIDTH] IN BLOOD BY AUTOMATED COUNT: 13.9 % (ref 11.5–14.5)
ETHANOL BLD-MCNC: <10 MG/DL
ETHANOL UR QL: <0.01 %
GFR SERPL CREATININE-BSD FRML MDRD: 86 ML/MIN/1.73
GLOBULIN UR ELPH-MCNC: 3.8 GM/DL
GLUCOSE BLD-MCNC: 84 MG/DL (ref 70–110)
GLUCOSE UR STRIP-MCNC: NEGATIVE MG/DL
HCT VFR BLD AUTO: 41.2 % (ref 37–47)
HGB BLD-MCNC: 13.1 G/DL (ref 12–16)
HGB UR QL STRIP.AUTO: ABNORMAL
HYALINE CASTS UR QL AUTO: ABNORMAL /LPF
IMM GRANULOCYTES # BLD: 0.01 10*3/MM3 (ref 0–0.03)
IMM GRANULOCYTES NFR BLD: 0.1 % (ref 0–0.5)
KETONES UR QL STRIP: NEGATIVE
LEUKOCYTE ESTERASE UR QL STRIP.AUTO: ABNORMAL
LYMPHOCYTES # BLD AUTO: 1.26 10*3/MM3 (ref 1–3)
LYMPHOCYTES NFR BLD AUTO: 18.1 % (ref 21–51)
MCH RBC QN AUTO: 27.9 PG (ref 27–33)
MCHC RBC AUTO-ENTMCNC: 31.8 G/DL (ref 33–37)
MCV RBC AUTO: 87.7 FL (ref 80–94)
METHADONE UR QL SCN: NEGATIVE
MONOCYTES # BLD AUTO: 0.56 10*3/MM3 (ref 0.1–0.9)
MONOCYTES NFR BLD AUTO: 8 % (ref 0–10)
NEUTROPHILS # BLD AUTO: 5.11 10*3/MM3 (ref 1.4–6.5)
NEUTROPHILS NFR BLD AUTO: 73.3 % (ref 30–70)
NITRITE UR QL STRIP: POSITIVE
OPIATES UR QL: NEGATIVE
OSMOLALITY SERPL CALC.SUM OF ELEC: 280.2 MOSM/KG (ref 273–305)
OXYCODONE UR QL SCN: NEGATIVE
PCP UR QL SCN: NEGATIVE
PH UR STRIP.AUTO: <=5 [PH] (ref 5–8)
PLATELET # BLD AUTO: 278 10*3/MM3 (ref 130–400)
PMV BLD AUTO: 10.2 FL (ref 6–10)
POTASSIUM BLD-SCNC: 3.8 MMOL/L (ref 3.5–5.3)
PROT SERPL-MCNC: 8.3 G/DL (ref 6–8)
PROT UR QL STRIP: ABNORMAL
RBC # BLD AUTO: 4.7 10*6/MM3 (ref 4.2–5.4)
RBC # UR: ABNORMAL /HPF
REF LAB TEST METHOD: ABNORMAL
SODIUM BLD-SCNC: 141 MMOL/L (ref 135–153)
SP GR UR STRIP: >1.03 (ref 1–1.03)
SQUAMOUS #/AREA URNS HPF: ABNORMAL /HPF
UROBILINOGEN UR QL STRIP: ABNORMAL
WBC NRBC COR # BLD: 6.98 10*3/MM3 (ref 4.5–12.5)
WBC UR QL AUTO: ABNORMAL /HPF

## 2018-04-10 PROCEDURE — 80053 COMPREHEN METABOLIC PANEL: CPT | Performed by: EMERGENCY MEDICINE

## 2018-04-10 PROCEDURE — 80307 DRUG TEST PRSMV CHEM ANLYZR: CPT | Performed by: EMERGENCY MEDICINE

## 2018-04-10 PROCEDURE — 81025 URINE PREGNANCY TEST: CPT | Performed by: EMERGENCY MEDICINE

## 2018-04-10 PROCEDURE — 85025 COMPLETE CBC W/AUTO DIFF WBC: CPT | Performed by: EMERGENCY MEDICINE

## 2018-04-10 PROCEDURE — 81001 URINALYSIS AUTO W/SCOPE: CPT | Performed by: EMERGENCY MEDICINE

## 2018-04-10 RX ORDER — ALUMINA, MAGNESIA, AND SIMETHICONE 2400; 2400; 240 MG/30ML; MG/30ML; MG/30ML
15 SUSPENSION ORAL EVERY 6 HOURS PRN
Status: DISCONTINUED | OUTPATIENT
Start: 2018-04-10 | End: 2018-04-13 | Stop reason: HOSPADM

## 2018-04-10 RX ORDER — HYDROXYZINE HYDROCHLORIDE 25 MG/1
25 TABLET, FILM COATED ORAL 2 TIMES DAILY
COMMUNITY
End: 2018-04-13 | Stop reason: HOSPADM

## 2018-04-10 RX ORDER — CITALOPRAM 20 MG/1
20 TABLET ORAL NIGHTLY
Status: ON HOLD | COMMUNITY
End: 2018-04-13

## 2018-04-10 RX ORDER — FAMOTIDINE 20 MG/1
20 TABLET, FILM COATED ORAL 2 TIMES DAILY PRN
Status: DISCONTINUED | OUTPATIENT
Start: 2018-04-10 | End: 2018-04-13 | Stop reason: HOSPADM

## 2018-04-10 RX ORDER — TRAZODONE HYDROCHLORIDE 50 MG/1
50 TABLET ORAL NIGHTLY
Status: ON HOLD | COMMUNITY
End: 2018-04-13

## 2018-04-10 RX ORDER — HYDROXYZINE 50 MG/1
50 TABLET, FILM COATED ORAL EVERY 6 HOURS PRN
Status: DISCONTINUED | OUTPATIENT
Start: 2018-04-10 | End: 2018-04-13 | Stop reason: HOSPADM

## 2018-04-10 RX ORDER — NICOTINE 21 MG/24HR
1 PATCH, TRANSDERMAL 24 HOURS TRANSDERMAL EVERY 24 HOURS
Status: DISCONTINUED | OUTPATIENT
Start: 2018-04-11 | End: 2018-04-13 | Stop reason: HOSPADM

## 2018-04-10 RX ORDER — HYDROXYZINE HYDROCHLORIDE 25 MG/1
25 TABLET, FILM COATED ORAL 2 TIMES DAILY
Status: CANCELLED | OUTPATIENT
Start: 2018-04-10

## 2018-04-10 RX ORDER — SULFAMETHOXAZOLE AND TRIMETHOPRIM 800; 160 MG/1; MG/1
1 TABLET ORAL ONCE
Status: COMPLETED | OUTPATIENT
Start: 2018-04-10 | End: 2018-04-10

## 2018-04-10 RX ORDER — BENZTROPINE MESYLATE 1 MG/ML
0.5 INJECTION INTRAMUSCULAR; INTRAVENOUS DAILY PRN
Status: DISCONTINUED | OUTPATIENT
Start: 2018-04-10 | End: 2018-04-13 | Stop reason: HOSPADM

## 2018-04-10 RX ORDER — TRAZODONE HYDROCHLORIDE 50 MG/1
50 TABLET ORAL NIGHTLY PRN
Status: DISCONTINUED | OUTPATIENT
Start: 2018-04-10 | End: 2018-04-11

## 2018-04-10 RX ORDER — SULFAMETHOXAZOLE AND TRIMETHOPRIM 800; 160 MG/1; MG/1
1 TABLET ORAL EVERY 12 HOURS SCHEDULED
Status: DISCONTINUED | OUTPATIENT
Start: 2018-04-11 | End: 2018-04-13 | Stop reason: HOSPADM

## 2018-04-10 RX ORDER — ECHINACEA PURPUREA EXTRACT 125 MG
2 TABLET ORAL AS NEEDED
Status: DISCONTINUED | OUTPATIENT
Start: 2018-04-10 | End: 2018-04-13 | Stop reason: HOSPADM

## 2018-04-10 RX ORDER — TRAZODONE HYDROCHLORIDE 50 MG/1
50 TABLET ORAL NIGHTLY
Status: CANCELLED | OUTPATIENT
Start: 2018-04-10

## 2018-04-10 RX ORDER — IBUPROFEN 600 MG/1
600 TABLET ORAL EVERY 6 HOURS PRN
Status: DISCONTINUED | OUTPATIENT
Start: 2018-04-10 | End: 2018-04-13 | Stop reason: HOSPADM

## 2018-04-10 RX ORDER — ONDANSETRON 4 MG/1
4 TABLET, FILM COATED ORAL EVERY 6 HOURS PRN
Status: DISCONTINUED | OUTPATIENT
Start: 2018-04-10 | End: 2018-04-13 | Stop reason: HOSPADM

## 2018-04-10 RX ORDER — BENZONATATE 100 MG/1
100 CAPSULE ORAL 3 TIMES DAILY PRN
Status: DISCONTINUED | OUTPATIENT
Start: 2018-04-10 | End: 2018-04-13 | Stop reason: HOSPADM

## 2018-04-10 RX ORDER — LOPERAMIDE HYDROCHLORIDE 2 MG/1
2 CAPSULE ORAL 4 TIMES DAILY PRN
Status: DISCONTINUED | OUTPATIENT
Start: 2018-04-10 | End: 2018-04-13 | Stop reason: HOSPADM

## 2018-04-10 RX ORDER — BENZTROPINE MESYLATE 1 MG/1
1 TABLET ORAL DAILY PRN
Status: DISCONTINUED | OUTPATIENT
Start: 2018-04-10 | End: 2018-04-13 | Stop reason: HOSPADM

## 2018-04-10 RX ADMIN — SULFAMETHOXAZOLE AND TRIMETHOPRIM 160 MG: 800; 160 TABLET ORAL at 21:26

## 2018-04-10 RX ADMIN — TRAZODONE HYDROCHLORIDE 50 MG: 50 TABLET ORAL at 22:46

## 2018-04-10 RX ADMIN — HYDROXYZINE HYDROCHLORIDE 50 MG: 50 TABLET ORAL at 22:46

## 2018-04-10 NOTE — ED NOTES
Pt taken to er 1, room 6, intake area at this time, suicidal precautions maintained and remain in place, emotional supportive care to pt at this time, report given to [place] intake RN at this time and other intake staff, care turned over to them at this time, pts mother in [place] and she is stating she needs to go home to her other kids, has been instr. To leave number with staff and notify staff of departure     Berkley Goddard RN  04/10/18 7041
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
## 2018-04-10 NOTE — ED NOTES
"Pt states to me that \"I need to get away before I hurt myself or hurt my mom\"     Berkley Goddard RN  04/10/18 1851    "

## 2018-04-11 PROCEDURE — 93005 ELECTROCARDIOGRAM TRACING: CPT | Performed by: PSYCHIATRY & NEUROLOGY

## 2018-04-11 PROCEDURE — 99222 1ST HOSP IP/OBS MODERATE 55: CPT | Performed by: PSYCHIATRY & NEUROLOGY

## 2018-04-11 PROCEDURE — 93010 ELECTROCARDIOGRAM REPORT: CPT | Performed by: INTERNAL MEDICINE

## 2018-04-11 RX ORDER — GUANFACINE 1 MG/1
1 TABLET ORAL 2 TIMES DAILY
Status: DISCONTINUED | OUTPATIENT
Start: 2018-04-11 | End: 2018-04-12

## 2018-04-11 RX ORDER — CITALOPRAM 20 MG/1
20 TABLET ORAL NIGHTLY
Status: DISCONTINUED | OUTPATIENT
Start: 2018-04-11 | End: 2018-04-13 | Stop reason: HOSPADM

## 2018-04-11 RX ORDER — TRAZODONE HYDROCHLORIDE 50 MG/1
50 TABLET ORAL NIGHTLY
Status: DISCONTINUED | OUTPATIENT
Start: 2018-04-11 | End: 2018-04-13 | Stop reason: HOSPADM

## 2018-04-11 RX ADMIN — TRAZODONE HYDROCHLORIDE 50 MG: 50 TABLET ORAL at 20:26

## 2018-04-11 RX ADMIN — NICOTINE 1 PATCH: 21 PATCH TRANSDERMAL at 08:29

## 2018-04-11 RX ADMIN — CITALOPRAM HYDROBROMIDE 20 MG: 20 TABLET ORAL at 20:26

## 2018-04-11 RX ADMIN — SULFAMETHOXAZOLE AND TRIMETHOPRIM 160 MG: 800; 160 TABLET ORAL at 20:26

## 2018-04-11 RX ADMIN — HYDROXYZINE HYDROCHLORIDE 50 MG: 50 TABLET ORAL at 14:23

## 2018-04-11 RX ADMIN — GUANFACINE HYDROCHLORIDE 1 MG: 1 TABLET ORAL at 11:36

## 2018-04-11 RX ADMIN — SULFAMETHOXAZOLE AND TRIMETHOPRIM 160 MG: 800; 160 TABLET ORAL at 08:29

## 2018-04-11 RX ADMIN — GUANFACINE HYDROCHLORIDE 1 MG: 1 TABLET ORAL at 20:26

## 2018-04-11 NOTE — PLAN OF CARE
Problem: Patient Care Overview  Goal: Plan of Care Review  Outcome: Ongoing (interventions implemented as appropriate)   04/10/18 7669   Coping/Psychosocial   Plan of Care Reviewed With patient   Coping/Psychosocial   Patient Agreement with Plan of Care agrees   Plan of Care Review   Progress no change   OTHER   Outcome Summary New Admit

## 2018-04-11 NOTE — PLAN OF CARE
Problem: Patient Care Overview  Goal: Plan of Care Review  Outcome: Ongoing (interventions implemented as appropriate)   04/11/18 5686   Coping/Psychosocial   Plan of Care Reviewed With patient   Coping/Psychosocial   Patient Agreement with Plan of Care agrees   Plan of Care Review   Progress no change   OTHER   Outcome Summary Pt in labile mood, reports she wants to be moved upstairs, tearful off and on today. Rates anx 4 and dep 3 this am. Denies SI.
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
## 2018-04-11 NOTE — NURSING NOTE
Reviewed patient and lab work with Dr. ULYSSES Ram. Admission orders read back and verified x 2. Patient and ED provider made aware.
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
## 2018-04-12 PROBLEM — F43.0 ACUTE STRESS REACTION: Status: ACTIVE | Noted: 2018-04-12

## 2018-04-12 PROBLEM — R45.851 SUICIDAL IDEATION: Status: RESOLVED | Noted: 2018-04-10 | Resolved: 2018-04-12

## 2018-04-12 PROCEDURE — 99232 SBSQ HOSP IP/OBS MODERATE 35: CPT | Performed by: PSYCHIATRY & NEUROLOGY

## 2018-04-12 RX ORDER — OXCARBAZEPINE 300 MG/1
300 TABLET, FILM COATED ORAL 2 TIMES DAILY
Status: DISCONTINUED | OUTPATIENT
Start: 2018-04-12 | End: 2018-04-13 | Stop reason: HOSPADM

## 2018-04-12 RX ORDER — GUANFACINE 1 MG/1
0.5 TABLET ORAL 2 TIMES DAILY
Status: DISCONTINUED | OUTPATIENT
Start: 2018-04-12 | End: 2018-04-12

## 2018-04-12 RX ADMIN — SULFAMETHOXAZOLE AND TRIMETHOPRIM 160 MG: 800; 160 TABLET ORAL at 20:32

## 2018-04-12 RX ADMIN — SULFAMETHOXAZOLE AND TRIMETHOPRIM 160 MG: 800; 160 TABLET ORAL at 09:02

## 2018-04-12 RX ADMIN — OXCARBAZEPINE 300 MG: 300 TABLET, FILM COATED ORAL at 20:32

## 2018-04-12 RX ADMIN — TRAZODONE HYDROCHLORIDE 50 MG: 50 TABLET ORAL at 20:32

## 2018-04-12 RX ADMIN — NICOTINE 1 PATCH: 21 PATCH TRANSDERMAL at 09:03

## 2018-04-12 RX ADMIN — CITALOPRAM HYDROBROMIDE 20 MG: 20 TABLET ORAL at 20:32

## 2018-04-12 NOTE — PLAN OF CARE
Problem: Patient Care Overview  Goal: Plan of Care Review  Outcome: Ongoing (interventions implemented as appropriate)   04/12/18 0659   Coping/Psychosocial   Plan of Care Reviewed With patient   Coping/Psychosocial   Patient Agreement with Plan of Care agrees   Plan of Care Review   Progress improving   OTHER   Outcome Summary Pt in cheerful mood today, focused on discharging, accepted plan for possible d/c tomorrow.

## 2018-04-12 NOTE — PLAN OF CARE
"Problem: Patient Care Overview  Goal: Interprofessional Rounds/Family Conf  Outcome: Ongoing (interventions implemented as appropriate)   04/12/18 1438   Interdisciplinary Rounds/Family Conf   Summary Staffed patient's case with Dr. Cervantes.   Interdisciplinary Rounds/Family Conf   Participants psychiatrist;social work   Discussed that patient appeared manipulative and requesting specific security staff to come on the unit because she was about to go off.  Discussed patient's mother/guardian is agreeable for patient to return home and reports she and her  can provide adequate supervision of patient at this time.  Discussed patient to have follow up with [facility] upon discharge.    D: Therapist met with patient for individual session to discuss progress with treatment and address concerns.  Patient very focused on discharge.  She reported, \"I'm going home tomorrow!\"  Patient discussed feeling more calm and not agitated today.  She appeared focused on other patient's treatment and asking when other patients would be discharged, perhaps attempt at manipulation.  Patient reported feeling safe to return home with her mother and reported positive conversation with her mother yesterday evening.  Patient reports plan to follow up with [facility] upon discharge.  A: Patient appeared to display more appropriate affect and \"great\" mood.  She adamantly denied any suicidal or homicidal thoughts.  She denied hallucinations.  Patient appeared to display limited insight and poor judgment.  Patient oriented x3.  She reported improved sleep.  P: Patient anticipated to discharge tomorrow and mother to transport.  Safety planning completed with patient's mother.  Patient has aftercare scheduled with [facility] upon discharge.  Patient verbalized understanding and agreed to seek nearest ER if she experiences suicidal or homicidal thoughts.        "

## 2018-04-12 NOTE — DISCHARGE INSTR - APPOINTMENTS
[facility]  [address]  [facility]    Ph: [phone]    Appt: April 17th @ 8:15am with Shawnee Thorpe

## 2018-04-12 NOTE — PLAN OF CARE
"Problem: Patient Care Overview  Goal: Plan of Care Review  Outcome: Ongoing (interventions implemented as appropriate)   04/12/18 0148   Coping/Psychosocial   Plan of Care Reviewed With patient   Coping/Psychosocial   Patient Agreement with Plan of Care agrees   Plan of Care Review   Progress improving   OTHER   Outcome Summary Pt cooperative this shift. Denies anxiety, depression, SI/HI and BALA. Pt states \"I'm ready to go home.\"         "

## 2018-04-12 NOTE — PROGRESS NOTES
"INPATIENT PSYCHIATRIC PROGRESS NOTE    Name:  Rosa Castro  :  2000  MRN:  [id number]  Visit Number:  [id number]  Length of stay:  2    SUBJECTIVE  CC: \"I'm ready to go home.\"     INTERVAL HISTORY:  The patient was seen for follow-up today.  She says she is ready to go home and denies all other symptoms.  When attempting to discuss other issues she comes back to \"I'm ready to go home.\"  She says she is already discussed this with her mom and her mom wants her to come home.  I reviewed the chart, it does not appear staff in the hospital have spoken with her mom to verify any of this yet.  She does deny nightmares, problems sleeping.  She denied suicidal or homicidal thoughts.  Denied any hallucinations.    I spoke to the patient's therapist and the patient's mother's was agreeable for her to return home.  She says that the patient has tried to run away 15 times in the past 2 months.  They put alarms on the home and her  also tries to provide supervision for the patient.  There was also reported the patient has been attention seeking on the unit.  She was upset yesterday and demanded to leave and asked that security be called but added that she only wanted one specific  to come see her.    Review of Systems   Constitutional: Negative.    HENT: Negative.    Respiratory: Negative.    Cardiovascular: Negative.    Gastrointestinal: Negative.    Genitourinary: Negative.    Musculoskeletal: Negative.    Neurological: Negative.    Psychiatric/Behavioral: Negative for dysphoric mood, hallucinations and suicidal ideas. The patient is not nervous/anxious.         OBJECTIVE    Temp:  [97 °F (36.1 °C)-98.7 °F (37.1 °C)] 97 °F (36.1 °C)  Heart Rate:  [64-94] 64  Resp:  [18-20] 20  BP: ()/(54-73) 90/54    MENTAL STATUS EXAM:  Appearance:Casually dressed, good hygeine.   Cooperation:Cooperative  Psychomotor: Slowness  Speech-normal rate, amount.  Mood \"I\"m good!\"   Affect- euthymic  Thought " Content- hyper-focused on going home  Thought process-linear, organized.  Suicidality: No SI  Homicidality: No HI  Perception: No AH/VH  Insight- poor  Judgement- poor    Lab Results (last 24 hours)     ** No results found for the last 24 hours. **             Imaging Results (last 24 hours)     ** No results found for the last 24 hours. **             ECG/EMG Results (most recent)     Procedure Component Value Units Date/Time    ECG 12 Lead [[id number]] Collected:  04/11/18 1340     Updated:  04/11/18 1855    Narrative:       Test Reason : Potential adverse reaction to medications.  Blood Pressure : **/** mmHG  Vent. Rate : 093 BPM     Atrial Rate : 093 BPM     P-R Int : 120 ms          QRS Dur : 090 ms      QT Int : 392 ms       P-R-T Axes : 035 037 017 degrees     QTc Int : 487 ms    Normal sinus rhythm with sinus arrhythmia  Nonspecific T wave abnormality  Prolonged QT  Abnormal ECG  When compared with ECG of 03-FEB-2018 02:57,  No significant change was found  Confirmed by Sinan Young ([id number]) on 4/11/2018 6:55:21 PM    Referred By:  COURTNEY           Confirmed By:Sinan Young           ALLERGIES: Review of patient's allergies indicates no known allergies.      Current Facility-Administered Medications:   •  aluminum-magnesium hydroxide-simethicone (MAALOX MAX) 400-400-40 MG/5ML suspension 15 mL, 15 mL, Oral, Q6H PRN, Isaiah Ram MD  •  benzonatate (TESSALON) capsule 100 mg, 100 mg, Oral, TID PRN, Isaiah Ram MD  •  benztropine (COGENTIN) tablet 1 mg, 1 mg, Oral, Daily PRN **OR** benztropine (COGENTIN) injection 0.5 mg, 0.5 mg, Intramuscular, Daily PRN, Isaiah Ram MD  •  citalopram (CeleXA) tablet 20 mg, 20 mg, Oral, Nightly, Gregg Cervantes MD, 20 mg at 04/11/18 2026  •  famotidine (PEPCID) tablet 20 mg, 20 mg, Oral, BID PRN, Isaiah Ram MD  •  guanFACINE (TENEX) tablet 0.5 mg, 0.5 mg, Oral, BID, Gregg Cervantes MD  •  hydrOXYzine (ATARAX) tablet 50 mg, 50 mg, Oral, Q6H  PRN, Isaiah Ram MD, 50 mg at 04/11/18 1423  •  ibuprofen (ADVIL,MOTRIN) tablet 600 mg, 600 mg, Oral, Q6H PRN, Isaiah Ram MD  •  loperamide (IMODIUM) capsule 2 mg, 2 mg, Oral, 4x Daily PRN, Isaiah Ram MD  •  magnesium hydroxide (MILK OF MAGNESIA) suspension 2400 mg/10mL 10 mL, 10 mL, Oral, Daily PRN, Isaiah Ram MD  •  nicotine (NICODERM CQ) 21 MG/24HR patch 1 patch, 1 patch, Transdermal, Q24H, Isaiah Ram MD, 1 patch at 04/12/18 0903  •  ondansetron (ZOFRAN) tablet 4 mg, 4 mg, Oral, Q6H PRN, Isaiah Ram MD  •  sodium chloride (OCEAN) nasal spray 2 spray, 2 spray, Each Nare, PRN, Isaiah Ram MD  •  sulfamethoxazole-trimethoprim (BACTRIM DS,SEPTRA DS) 800-160 MG per tablet 160 mg, 1 tablet, Oral, Q12H, Moise Wray MD, 160 mg at 04/12/18 0902  •  traZODone (DESYREL) tablet 50 mg, 50 mg, Oral, Nightly, Gregg Cervantes MD, 50 mg at 04/11/18 2026    ASSESSMENT & PLAN:    Principal Problem:    Acute stress reaction    Impulse control disorder    Intellectual disability    Plan: Continue home medications of Celexa 20 mg daily.  There continues to be concerned about the patient's impulsivity.  We'll stop Tenex and try Trileptal 300 mg twice a day.  May consider discharge home tomorrow.       Suicide precautions: Suicide precaution Level 3 (q15 min checks)     Behavioral Health Treatment Plan and Problem List: I have reviewed and approved the Behavioral Health Treatment Plan and Problem list.  The patient has had a chance to review and agrees with the treatment plan.     Clinician:  Gregg Cervantes MD  04/12/18  1:25 PM

## 2018-04-13 VITALS
HEART RATE: 92 BPM | WEIGHT: 197.4 LBS | DIASTOLIC BLOOD PRESSURE: 81 MMHG | BODY MASS INDEX: 39.8 KG/M2 | RESPIRATION RATE: 18 BRPM | TEMPERATURE: 97.5 F | SYSTOLIC BLOOD PRESSURE: 120 MMHG | OXYGEN SATURATION: 98 % | HEIGHT: 59 IN

## 2018-04-13 PROCEDURE — 99238 HOSP IP/OBS DSCHRG MGMT 30/<: CPT | Performed by: PSYCHIATRY & NEUROLOGY

## 2018-04-13 RX ORDER — CITALOPRAM 20 MG/1
20 TABLET ORAL NIGHTLY
Qty: 30 TABLET | Refills: 0 | Status: SHIPPED | OUTPATIENT
Start: 2018-04-13 | End: 2018-04-19 | Stop reason: HOSPADM

## 2018-04-13 RX ORDER — TRAZODONE HYDROCHLORIDE 50 MG/1
50 TABLET ORAL NIGHTLY
Qty: 30 TABLET | Refills: 0 | Status: ON HOLD | OUTPATIENT
Start: 2018-04-13 | End: 2018-04-26

## 2018-04-13 RX ORDER — OXCARBAZEPINE 300 MG/1
300 TABLET, FILM COATED ORAL 2 TIMES DAILY
Qty: 60 TABLET | Refills: 0 | Status: SHIPPED | OUTPATIENT
Start: 2018-04-13 | End: 2018-04-19 | Stop reason: HOSPADM

## 2018-04-13 RX ORDER — SULFAMETHOXAZOLE AND TRIMETHOPRIM 800; 160 MG/1; MG/1
1 TABLET ORAL EVERY 12 HOURS SCHEDULED
Qty: 9 TABLET | Refills: 0 | Status: SHIPPED | OUTPATIENT
Start: 2018-04-13 | End: 2018-04-19 | Stop reason: HOSPADM

## 2018-04-13 RX ADMIN — SULFAMETHOXAZOLE AND TRIMETHOPRIM 160 MG: 800; 160 TABLET ORAL at 08:19

## 2018-04-13 RX ADMIN — NICOTINE 1 PATCH: 21 PATCH TRANSDERMAL at 08:19

## 2018-04-13 RX ADMIN — OXCARBAZEPINE 300 MG: 300 TABLET, FILM COATED ORAL at 08:19

## 2018-04-13 NOTE — PLAN OF CARE
"Problem: Patient Care Overview  Goal: Discharge Needs Assessment  Outcome: Ongoing (interventions implemented as appropriate)   04/10/18 2237 04/11/18 1021 04/13/18 1207   Discharge Needs Assessment   Readmission Within the Last 30 Days --  no previous admission in last 30 days --    Concerns to be Addressed --  --  decision making;coping/stress;mental health   Patient/Family Anticipates Transition to --  --  home with family   Patient/Family Anticipated Services at Transition --  mental health services;outpatient care --    Transportation Anticipated family or friend will provide --  --    Patient's Choice of Community Agency(s) --  [facility] --    Current Discharge Risk --  psychiatric illness --    Discharge Coordination/Progress --  --  Patient has insurance for medication and reports her mother will transport.   Discharge Needs Assessment,    Outpatient/Agency/Support Group Needs --  case management;outpatient counseling;outpatient medication management;outpatient psychiatric care (specify) --    Anticipated Discharge Disposition --  home or self-care --      DATA:  Met with patient this morning at her request.  She reported that she is hopeful to discharge home today.  Discussed that patients primary therapist Shawnee had contacted patients mother to establish safety and disposition.  Discussed that patient will be following up with [facility].  Patient discussed the events that occurred prior to her admission.  She reported that she stated she was suicidal in order to be in a safe place.  She reported that she does feel safe when she is here.  She reported that she will not be trusting any strangers in the future that appear to be \"nice.\"  She reported that she has been at times acting out on the unit but she has been doing well since yesterday because she wants to return home.      ASSESSMENT:  Patient is denying suicidal ideation today and denying homicidal ideation today.  Patient reports feeling " better and reports she is ready for discharge home today.    PLAN:  Patient is hopeful for discharge home with her mother today.  Patient has an appointment with [facility] on April 17th at 8:15 am.

## 2018-04-13 NOTE — PLAN OF CARE
Problem: Patient Care Overview  Goal: Plan of Care Review  Outcome: Ongoing (interventions implemented as appropriate)   04/13/18 0103   Coping/Psychosocial   Plan of Care Reviewed With patient   Coping/Psychosocial   Patient Agreement with Plan of Care agrees   Plan of Care Review   Progress improving   OTHER   Outcome Summary Pt in pleasent mood this shift. Denies SI/HI and BALA. Pt hopes for d/c today.
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
## 2018-04-14 ENCOUNTER — HOSPITAL ENCOUNTER (EMERGENCY)
Facility: HOSPITAL | Age: 18
Discharge: STILL ADMITTED OR ADMINISTRATIVE | End: 2018-04-15
Attending: EMERGENCY MEDICINE

## 2018-04-14 DIAGNOSIS — F52.8 HYPERSEXUALITY STATE: Primary | ICD-10-CM

## 2018-04-14 DIAGNOSIS — F29 PSYCHOSIS, UNSPECIFIED PSYCHOSIS TYPE (HCC): ICD-10-CM

## 2018-04-14 LAB
6-ACETYL MORPHINE: NEGATIVE
ALBUMIN SERPL-MCNC: 4.5 G/DL (ref 3.5–5)
ALBUMIN/GLOB SERPL: 1.2 G/DL (ref 1.5–2.5)
ALP SERPL-CCNC: 92 U/L (ref 35–104)
ALT SERPL W P-5'-P-CCNC: 26 U/L (ref 10–36)
AMPHET+METHAMPHET UR QL: NEGATIVE
ANION GAP SERPL CALCULATED.3IONS-SCNC: 6.7 MMOL/L (ref 3.6–11.2)
AST SERPL-CCNC: 23 U/L (ref 10–30)
B-HCG UR QL: NEGATIVE
BACTERIA UR QL AUTO: ABNORMAL /HPF
BARBITURATES UR QL SCN: NEGATIVE
BASOPHILS # BLD AUTO: 0.04 10*3/MM3 (ref 0–0.3)
BASOPHILS NFR BLD AUTO: 0.4 % (ref 0–2)
BENZODIAZ UR QL SCN: NEGATIVE
BILIRUB SERPL-MCNC: 0.2 MG/DL (ref 0.2–1.8)
BILIRUB UR QL STRIP: NEGATIVE
BUN BLD-MCNC: 9 MG/DL (ref 7–21)
BUN/CREAT SERPL: 10.2 (ref 7–25)
BUPRENORPHINE SERPL-MCNC: NEGATIVE NG/ML
CALCIUM SPEC-SCNC: 9.5 MG/DL (ref 7.7–10)
CANNABINOIDS SERPL QL: NEGATIVE
CHLORIDE SERPL-SCNC: 110 MMOL/L (ref 99–112)
CLARITY UR: CLEAR
CO2 SERPL-SCNC: 20.3 MMOL/L (ref 24.3–31.9)
COCAINE UR QL: NEGATIVE
COLOR UR: YELLOW
CREAT BLD-MCNC: 0.88 MG/DL (ref 0.43–1.29)
DEPRECATED RDW RBC AUTO: 44.8 FL (ref 37–54)
EOSINOPHIL # BLD AUTO: 0.05 10*3/MM3 (ref 0–0.7)
EOSINOPHIL NFR BLD AUTO: 0.5 % (ref 0–5)
ERYTHROCYTE [DISTWIDTH] IN BLOOD BY AUTOMATED COUNT: 14.1 % (ref 11.5–14.5)
ETHANOL BLD-MCNC: <10 MG/DL
ETHANOL UR QL: <0.01 %
GFR SERPL CREATININE-BSD FRML MDRD: 83 ML/MIN/1.73
GLOBULIN UR ELPH-MCNC: 3.9 GM/DL
GLUCOSE BLD-MCNC: 98 MG/DL (ref 70–110)
GLUCOSE UR STRIP-MCNC: NEGATIVE MG/DL
HCT VFR BLD AUTO: 39.9 % (ref 37–47)
HGB BLD-MCNC: 12.8 G/DL (ref 12–16)
HGB UR QL STRIP.AUTO: ABNORMAL
HYALINE CASTS UR QL AUTO: ABNORMAL /LPF
IMM GRANULOCYTES # BLD: 0.02 10*3/MM3 (ref 0–0.03)
IMM GRANULOCYTES NFR BLD: 0.2 % (ref 0–0.5)
KETONES UR QL STRIP: NEGATIVE
LEUKOCYTE ESTERASE UR QL STRIP.AUTO: NEGATIVE
LYMPHOCYTES # BLD AUTO: 1.96 10*3/MM3 (ref 1–3)
LYMPHOCYTES NFR BLD AUTO: 18.1 % (ref 21–51)
MCH RBC QN AUTO: 28.2 PG (ref 27–33)
MCHC RBC AUTO-ENTMCNC: 32.1 G/DL (ref 33–37)
MCV RBC AUTO: 87.9 FL (ref 80–94)
METHADONE UR QL SCN: NEGATIVE
MONOCYTES # BLD AUTO: 0.4 10*3/MM3 (ref 0.1–0.9)
MONOCYTES NFR BLD AUTO: 3.7 % (ref 0–10)
NEUTROPHILS # BLD AUTO: 8.37 10*3/MM3 (ref 1.4–6.5)
NEUTROPHILS NFR BLD AUTO: 77.1 % (ref 30–70)
NITRITE UR QL STRIP: NEGATIVE
OPIATES UR QL: NEGATIVE
OSMOLALITY SERPL CALC.SUM OF ELEC: 272.5 MOSM/KG (ref 273–305)
OXYCODONE UR QL SCN: NEGATIVE
PCP UR QL SCN: NEGATIVE
PH UR STRIP.AUTO: 6 [PH] (ref 5–8)
PLATELET # BLD AUTO: 360 10*3/MM3 (ref 130–400)
PMV BLD AUTO: 9.7 FL (ref 6–10)
POTASSIUM BLD-SCNC: 4.5 MMOL/L (ref 3.5–5.3)
PROT SERPL-MCNC: 8.4 G/DL (ref 6–8)
PROT UR QL STRIP: NEGATIVE
RBC # BLD AUTO: 4.54 10*6/MM3 (ref 4.2–5.4)
RBC # UR: ABNORMAL /HPF
REF LAB TEST METHOD: ABNORMAL
SODIUM BLD-SCNC: 137 MMOL/L (ref 135–153)
SP GR UR STRIP: 1.01 (ref 1–1.03)
SQUAMOUS #/AREA URNS HPF: ABNORMAL /HPF
UROBILINOGEN UR QL STRIP: ABNORMAL
WBC NRBC COR # BLD: 10.84 10*3/MM3 (ref 4.5–12.5)
WBC UR QL AUTO: ABNORMAL /HPF

## 2018-04-14 PROCEDURE — 80307 DRUG TEST PRSMV CHEM ANLYZR: CPT | Performed by: NURSE PRACTITIONER

## 2018-04-14 PROCEDURE — 81025 URINE PREGNANCY TEST: CPT | Performed by: NURSE PRACTITIONER

## 2018-04-14 PROCEDURE — 81001 URINALYSIS AUTO W/SCOPE: CPT | Performed by: NURSE PRACTITIONER

## 2018-04-14 PROCEDURE — 80053 COMPREHEN METABOLIC PANEL: CPT | Performed by: NURSE PRACTITIONER

## 2018-04-14 PROCEDURE — 85025 COMPLETE CBC W/AUTO DIFF WBC: CPT | Performed by: NURSE PRACTITIONER

## 2018-04-15 ENCOUNTER — HOSPITAL ENCOUNTER (INPATIENT)
Facility: HOSPITAL | Age: 18
LOS: 4 days | Discharge: HOME | End: 2018-04-19
Attending: PSYCHIATRY & NEUROLOGY | Admitting: PSYCHIATRY & NEUROLOGY

## 2018-04-15 VITALS
RESPIRATION RATE: 18 BRPM | OXYGEN SATURATION: 97 % | TEMPERATURE: 97.8 F | HEART RATE: 85 BPM | HEIGHT: 59 IN | SYSTOLIC BLOOD PRESSURE: 133 MMHG | BODY MASS INDEX: 40.12 KG/M2 | WEIGHT: 199 LBS | DIASTOLIC BLOOD PRESSURE: 82 MMHG

## 2018-04-15 PROBLEM — F32.9 MDD (MAJOR DEPRESSIVE DISORDER): Status: ACTIVE | Noted: 2018-04-15

## 2018-04-15 PROCEDURE — 93010 ELECTROCARDIOGRAM REPORT: CPT | Performed by: INTERNAL MEDICINE

## 2018-04-15 PROCEDURE — 99223 1ST HOSP IP/OBS HIGH 75: CPT | Performed by: PSYCHIATRY & NEUROLOGY

## 2018-04-15 PROCEDURE — 93005 ELECTROCARDIOGRAM TRACING: CPT | Performed by: PSYCHIATRY & NEUROLOGY

## 2018-04-15 RX ORDER — OXCARBAZEPINE 300 MG/1
300 TABLET, FILM COATED ORAL 2 TIMES DAILY
Status: DISCONTINUED | OUTPATIENT
Start: 2018-04-15 | End: 2018-04-16

## 2018-04-15 RX ORDER — FAMOTIDINE 20 MG/1
20 TABLET, FILM COATED ORAL 2 TIMES DAILY PRN
Status: DISCONTINUED | OUTPATIENT
Start: 2018-04-15 | End: 2018-04-19 | Stop reason: HOSPADM

## 2018-04-15 RX ORDER — TRAZODONE HYDROCHLORIDE 50 MG/1
50 TABLET ORAL NIGHTLY
Status: DISCONTINUED | OUTPATIENT
Start: 2018-04-15 | End: 2018-04-19 | Stop reason: HOSPADM

## 2018-04-15 RX ORDER — LOPERAMIDE HYDROCHLORIDE 2 MG/1
2 CAPSULE ORAL 4 TIMES DAILY PRN
Status: DISCONTINUED | OUTPATIENT
Start: 2018-04-15 | End: 2018-04-19 | Stop reason: HOSPADM

## 2018-04-15 RX ORDER — CITALOPRAM 20 MG/1
20 TABLET ORAL NIGHTLY
Status: DISCONTINUED | OUTPATIENT
Start: 2018-04-15 | End: 2018-04-16

## 2018-04-15 RX ORDER — ONDANSETRON 4 MG/1
4 TABLET, FILM COATED ORAL EVERY 6 HOURS PRN
Status: DISCONTINUED | OUTPATIENT
Start: 2018-04-15 | End: 2018-04-19 | Stop reason: HOSPADM

## 2018-04-15 RX ORDER — HYDROXYZINE 50 MG/1
50 TABLET, FILM COATED ORAL EVERY 6 HOURS PRN
Status: DISCONTINUED | OUTPATIENT
Start: 2018-04-15 | End: 2018-04-19 | Stop reason: HOSPADM

## 2018-04-15 RX ORDER — BENZTROPINE MESYLATE 1 MG/ML
0.5 INJECTION INTRAMUSCULAR; INTRAVENOUS DAILY PRN
Status: DISCONTINUED | OUTPATIENT
Start: 2018-04-15 | End: 2018-04-19 | Stop reason: HOSPADM

## 2018-04-15 RX ORDER — SULFAMETHOXAZOLE AND TRIMETHOPRIM 800; 160 MG/1; MG/1
1 TABLET ORAL EVERY 12 HOURS SCHEDULED
Status: DISCONTINUED | OUTPATIENT
Start: 2018-04-15 | End: 2018-04-17

## 2018-04-15 RX ORDER — NICOTINE 21 MG/24HR
1 PATCH, TRANSDERMAL 24 HOURS TRANSDERMAL EVERY 24 HOURS
Status: DISCONTINUED | OUTPATIENT
Start: 2018-04-15 | End: 2018-04-19 | Stop reason: HOSPADM

## 2018-04-15 RX ORDER — ALUMINA, MAGNESIA, AND SIMETHICONE 2400; 2400; 240 MG/30ML; MG/30ML; MG/30ML
15 SUSPENSION ORAL EVERY 6 HOURS PRN
Status: DISCONTINUED | OUTPATIENT
Start: 2018-04-15 | End: 2018-04-19 | Stop reason: HOSPADM

## 2018-04-15 RX ORDER — BENZONATATE 100 MG/1
100 CAPSULE ORAL 3 TIMES DAILY PRN
Status: DISCONTINUED | OUTPATIENT
Start: 2018-04-15 | End: 2018-04-19 | Stop reason: HOSPADM

## 2018-04-15 RX ORDER — TRAZODONE HYDROCHLORIDE 50 MG/1
50 TABLET ORAL NIGHTLY PRN
Status: DISCONTINUED | OUTPATIENT
Start: 2018-04-15 | End: 2018-04-15 | Stop reason: DRUGHIGH

## 2018-04-15 RX ORDER — ACETAMINOPHEN 325 MG/1
650 TABLET ORAL EVERY 4 HOURS PRN
Status: DISCONTINUED | OUTPATIENT
Start: 2018-04-15 | End: 2018-04-19 | Stop reason: HOSPADM

## 2018-04-15 RX ORDER — BENZTROPINE MESYLATE 1 MG/1
1 TABLET ORAL DAILY PRN
Status: DISCONTINUED | OUTPATIENT
Start: 2018-04-15 | End: 2018-04-19 | Stop reason: HOSPADM

## 2018-04-15 RX ORDER — ECHINACEA PURPUREA EXTRACT 125 MG
2 TABLET ORAL AS NEEDED
Status: DISCONTINUED | OUTPATIENT
Start: 2018-04-15 | End: 2018-04-19 | Stop reason: HOSPADM

## 2018-04-15 RX ADMIN — TRAZODONE HYDROCHLORIDE 50 MG: 50 TABLET ORAL at 22:39

## 2018-04-15 RX ADMIN — SULFAMETHOXAZOLE AND TRIMETHOPRIM 160 MG: 800; 160 TABLET ORAL at 20:54

## 2018-04-15 RX ADMIN — ONDANSETRON 4 MG: 4 TABLET, FILM COATED ORAL at 16:24

## 2018-04-15 RX ADMIN — OXCARBAZEPINE 300 MG: 300 TABLET, FILM COATED ORAL at 20:54

## 2018-04-15 RX ADMIN — NICOTINE 1 PATCH: 21 PATCH TRANSDERMAL at 09:22

## 2018-04-15 RX ADMIN — OXCARBAZEPINE 300 MG: 300 TABLET, FILM COATED ORAL at 13:02

## 2018-04-15 RX ADMIN — CITALOPRAM HYDROBROMIDE 20 MG: 20 TABLET ORAL at 20:54

## 2018-04-15 RX ADMIN — SULFAMETHOXAZOLE AND TRIMETHOPRIM 160 MG: 800; 160 TABLET ORAL at 13:02

## 2018-04-15 NOTE — ED PROVIDER NOTES
Subjective     History provided by:  Patient and caregiver   used: No    Mental Health Problem   Presenting symptoms: aggressive behavior, agitation, bizarre behavior and depression    Degree of incapacity (severity):  Moderate  Onset quality:  Gradual  Timing:  Intermittent  Progression:  Waxing and waning  Chronicity:  Recurrent  Context: noncompliance and recent medication change    Relieved by:  Nothing  Worsened by:  Nothing  Ineffective treatments:  None tried  Associated symptoms: anxiety, feelings of worthlessness and irritability    Risk factors: hx of mental illness and recent psychiatric admission    Risk factors comment:  Recently dc from this facility       Review of Systems   Constitutional: Positive for irritability.   HENT: Negative.    Eyes: Negative.    Respiratory: Negative.    Cardiovascular: Negative.    Gastrointestinal: Negative.    Endocrine: Negative.    Genitourinary: Negative.    Musculoskeletal: Negative.    Skin: Negative.    Allergic/Immunologic: Negative.    Neurological: Negative.    Hematological: Negative.    Psychiatric/Behavioral: Positive for agitation, behavioral problems, decreased concentration and dysphoric mood. The patient is nervous/anxious and is hyperactive.    All other systems reviewed and are negative.      Past Medical History:   Diagnosis Date   • Anxiety    • Depression    • Intellectual disability        No Known Allergies    Past Surgical History:   Procedure Laterality Date   • EYE SURGERY Bilateral     childhood       Family History   Problem Relation Age of Onset   • Family history unknown: Yes       Social History     Social History   • Marital status: Single     Social History Main Topics   • Smoking status: Current Every Day Smoker     Packs/day: 1.00     Years: 1.00     Types: Cigarettes   • Smokeless tobacco: Never Used   • Alcohol use No   • Drug use: No   • Sexual activity: Not Currently     Partners: Male     Other Topics Concern    • Not on file           Objective   Physical Exam   Constitutional: She appears well-developed and well-nourished.   HENT:   Head: Normocephalic.   Eyes: Pupils are equal, round, and reactive to light.   Neck: Normal range of motion. Neck supple.   Cardiovascular: Normal rate, regular rhythm, normal heart sounds and intact distal pulses.    Pulmonary/Chest: Effort normal and breath sounds normal.   Abdominal: Soft. Bowel sounds are normal.   Musculoskeletal: Normal range of motion.   Neurological: She is alert.   Skin: Skin is warm and dry. Capillary refill takes less than 2 seconds.   Psychiatric:   Patient hyperactive, running about room. Patient loud and hard to redirect.    Nursing note and vitals reviewed.      

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
## 2018-04-15 NOTE — ED NOTES
WALKED PT TO INTAKE PT HANDOFF TO SINCERE MARSH RN LAW ENFORCEMENT STIL LPRESENT AT THIS TIME     Steph Woodruff RN  04/14/18 8217

## 2018-04-15 NOTE — PLAN OF CARE
Problem: Patient Care Overview  Goal: Plan of Care Review  Outcome: Ongoing (interventions implemented as appropriate)   04/15/18 1557   Coping/Psychosocial   Plan of Care Reviewed With patient   Coping/Psychosocial   Patient Agreement with Plan of Care agrees   Plan of Care Review   Progress no change   OTHER   Outcome Summary PT RATES ANXIETY 4/10. DENIES DEPRESSION, SI, HI, OR A/V HALLUCINATIONS.        Problem: Overarching Goals (Adult)  Goal: Adheres to Safety Considerations for Self and Others  Outcome: Ongoing (interventions implemented as appropriate)    Goal: Optimized Coping Skills in Response to Life Stressors  Outcome: Ongoing (interventions implemented as appropriate)    Goal: Develops/Participates in Therapeutic [place] to Support Successful Transition  Outcome: Ongoing (interventions implemented as appropriate)
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
## 2018-04-16 PROBLEM — F32.9 MDD (MAJOR DEPRESSIVE DISORDER): Status: RESOLVED | Noted: 2018-04-15 | Resolved: 2018-04-16

## 2018-04-16 PROCEDURE — 99232 SBSQ HOSP IP/OBS MODERATE 35: CPT | Performed by: PSYCHIATRY & NEUROLOGY

## 2018-04-16 RX ORDER — OXCARBAZEPINE 300 MG/1
450 TABLET, FILM COATED ORAL 2 TIMES DAILY
Status: DISCONTINUED | OUTPATIENT
Start: 2018-04-16 | End: 2018-04-18

## 2018-04-16 RX ADMIN — SULFAMETHOXAZOLE AND TRIMETHOPRIM 160 MG: 800; 160 TABLET ORAL at 20:51

## 2018-04-16 RX ADMIN — OXCARBAZEPINE 300 MG: 300 TABLET, FILM COATED ORAL at 08:52

## 2018-04-16 RX ADMIN — OXCARBAZEPINE 450 MG: 300 TABLET, FILM COATED ORAL at 20:51

## 2018-04-16 RX ADMIN — NICOTINE 1 PATCH: 21 PATCH TRANSDERMAL at 08:53

## 2018-04-16 RX ADMIN — SULFAMETHOXAZOLE AND TRIMETHOPRIM 160 MG: 800; 160 TABLET ORAL at 08:52

## 2018-04-16 NOTE — PLAN OF CARE
Problem: Patient Care Overview  Goal: Plan of Care Review  Outcome: Ongoing (interventions implemented as appropriate)   04/16/18 0323   Coping/Psychosocial   Plan of Care Reviewed With patient   Coping/Psychosocial   Patient Agreement with Plan of Care agrees   Plan of Care Review   Progress no change   OTHER   Outcome Summary Pt preoccupied with possibility of being pregnant. Rates anxiety 2/10, Denies depression, denies SI/HI and denies AVH. 4/16/18 0327       Problem: Overarching Goals (Adult)  Goal: Adheres to Safety Considerations for Self and Others  Outcome: Unable to achieve outcome(s) by discharge Date Met: 04/16/18    Goal: Optimized Coping Skills in Response to Life Stressors  Outcome: Ongoing (interventions implemented as appropriate)    Goal: Develops/Participates in Therapeutic [place] to Support Successful Transition  Outcome: Ongoing (interventions implemented as appropriate)
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
## 2018-04-16 NOTE — PROGRESS NOTES
"1630   Received phone call from patients mother we discussed services that might be available to her and her family and concerns for her safety regarding to running away from home.  She reports that she receives services from Adult Protective Services and has a worker assigned to work with the family who has suggested that they consider placing her in a facility like [facility] also receives services from [facility] case management services as well as a worker from the [facility] program. She reports a female  with the [facility] came to the house today to see her and scheduled a appointment to meet with her later in the week and she will be attending the [facility] in [place]. We also discussed the TRP program with [facility]. She says they suggested that her case be moved to [facility] office and hopes the doctor there will change her medications to help with her \"hormones\" according to her mother.     Patients mother agrees to discuss options for placement with her  and states she is still trying to get guardianship that she has emergency only at this time. Patients mother also states the patient functions on a 2nd grade level but does not know her IQ score.   "

## 2018-04-16 NOTE — PLAN OF CARE
"Problem: Patient Care Overview  Goal: Plan of Care Review  Outcome: Ongoing (interventions implemented as appropriate)   04/16/18 1620   Coping/Psychosocial   Plan of Care Reviewed With patient   Coping/Psychosocial   Patient Agreement with Plan of Care agrees   Plan of Care Review   Progress improving   OTHER   Outcome Summary PT CONTINUES TO BE LOUD AND HYPERVERBAL. REPORTS \"A LITTLE\" ANXIETY. DENIES ANY DEPRESSION, SI, HI, OR A/V HALLUCINATIONS. PT IS HYPERSEXUAL AT TIMES THROUGHOUT THE SHIFT.        Problem: Overarching Goals (Adult)  Goal: Adheres to Safety Considerations for Self and Others  Outcome: Ongoing (interventions implemented as appropriate)    Goal: Optimized Coping Skills in Response to Life Stressors  Outcome: Ongoing (interventions implemented as appropriate)    Goal: Develops/Participates in Therapeutic [place] to Support Successful Transition  Outcome: Ongoing (interventions implemented as appropriate)        "
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
## 2018-04-17 PROCEDURE — 99231 SBSQ HOSP IP/OBS SF/LOW 25: CPT | Performed by: PSYCHIATRY & NEUROLOGY

## 2018-04-17 RX ADMIN — OXCARBAZEPINE 450 MG: 300 TABLET, FILM COATED ORAL at 20:43

## 2018-04-17 RX ADMIN — NICOTINE 1 PATCH: 21 PATCH TRANSDERMAL at 08:25

## 2018-04-17 RX ADMIN — TRAZODONE HYDROCHLORIDE 50 MG: 50 TABLET ORAL at 22:12

## 2018-04-17 RX ADMIN — OXCARBAZEPINE 450 MG: 300 TABLET, FILM COATED ORAL at 08:25

## 2018-04-17 RX ADMIN — SULFAMETHOXAZOLE AND TRIMETHOPRIM 160 MG: 800; 160 TABLET ORAL at 08:25

## 2018-04-17 NOTE — PROGRESS NOTES
"INPATIENT PSYCHIATRIC PROGRESS NOTE    Name:  Rosa Castro  :  2000  MRN:  [id number]  Visit Number:  [id number]  Length of stay:  2    SUBJECTIVE  CC: f/u impulse control     INTERVAL HISTORY:  Rosa was seen for follow-up today.  She reports she's feeling good and ready to go home.  She has no complaints.  On the unit she continued to be hyperverbal, loud, and hypersexual at times.  She denies any medication side effects.      The patient's mother reported that others have also suggested an SCL.  So far she has not done this.  She is agreeable to getting her into the [facility] in [place] and was also agreeable to a TR program if needed as well.  The patient tells me her mom wants her to come home and does not want to send her to an SCL    Sleep: good    Review of Systems   Constitutional: Negative.    Respiratory: Negative.    Cardiovascular: Negative.    Gastrointestinal: Negative.    Genitourinary: Negative.    Musculoskeletal: Negative.    Neurological: Negative.    Psychiatric/Behavioral: Positive for behavioral problems. Negative for suicidal ideas.       OBJECTIVE    Temp:  [97.4 °F (36.3 °C)-98.1 °F (36.7 °C)] 98.1 °F (36.7 °C)  Heart Rate:  [] 116  Resp:  [18] 18  BP: (125-159)/(75-90) 125/75    MENTAL STATUS EXAM:  Appearance:Casually dressed, good hygeine.   Cooperation:Cooperative  Psychomotor: No psychomotor agitation/retardation, No EPS, No motor tics  Speech- talkative.  Mood \"good\"   Affect- less labile today  Thought Content-goal directed, no delusional material present  Thought process-linear, organized.  Suicidality: No SI  Homicidality: No HI  Perception: No AH/VH  Insight- poor  Judgement- poor    Lab Results (last 24 hours)     ** No results found for the last 24 hours. **             Imaging Results (last 24 hours)     ** No results found for the last 24 hours. **             ECG/EMG Results (most recent)     Procedure Component Value Units Date/Time    ECG 12 Lead " [[id number]] Collected:  04/15/18 0453     Updated:  04/15/18 1948    Narrative:       Test Reason : Potential adverse reaction to medications.  Blood Pressure : **/** mmHG  Vent. Rate : 076 BPM     Atrial Rate : 076 BPM     P-R Int : 136 ms          QRS Dur : 088 ms      QT Int : 386 ms       P-R-T Axes : 029 034 017 degrees     QTc Int : 434 ms    Normal sinus rhythm  Normal ECG  When compared with ECG of 11-APR-2018 13:40,  No significant change was found  Confirmed by Darrell Mcrae ([id number]) on 4/15/2018 7:48:33 PM    Referred By:  KAVITHA           Confirmed By:Darrell Mcrae           ALLERGIES: Review of patient's allergies indicates no known allergies.      Current Facility-Administered Medications:   •  acetaminophen (TYLENOL) tablet 650 mg, 650 mg, Oral, Q4H PRN, Isaiah Ram MD  •  aluminum-magnesium hydroxide-simethicone (MAALOX MAX) 400-400-40 MG/5ML suspension 15 mL, 15 mL, Oral, Q6H PRN, Isaiah Ram MD  •  benzonatate (TESSALON) capsule 100 mg, 100 mg, Oral, TID PRN, Isaiah Ram MD  •  benztropine (COGENTIN) tablet 1 mg, 1 mg, Oral, Daily PRN **OR** benztropine (COGENTIN) injection 0.5 mg, 0.5 mg, Intramuscular, Daily PRN, Isaiah Ram MD  •  famotidine (PEPCID) tablet 20 mg, 20 mg, Oral, BID PRN, Isaiah Ram MD  •  hydrOXYzine (ATARAX) tablet 50 mg, 50 mg, Oral, Q6H PRN, Isaiah Ram MD  •  loperamide (IMODIUM) capsule 2 mg, 2 mg, Oral, 4x Daily PRN, Isaiah Ram MD  •  magnesium hydroxide (MILK OF MAGNESIA) suspension 2400 mg/10mL 10 mL, 10 mL, Oral, Daily PRN, Isaiah Ram MD  •  nicotine (NICODERM CQ) 21 MG/24HR patch 1 patch, 1 patch, Transdermal, Q24H, Isaiah Ram MD, 1 patch at 04/17/18 0825  •  ondansetron (ZOFRAN) tablet 4 mg, 4 mg, Oral, Q6H PRN, Isaiah Ram MD, 4 mg at 04/15/18 1624  •  OXcarbazepine (TRILEPTAL) tablet 450 mg, 450 mg, Oral, BID, Gregg Cervantes MD, 450 mg at 04/17/18 0825  •  sodium chloride (OCEAN) nasal spray 2 spray, 2 spray,  LILLIE Grimm, Isaiah Ram MD  •  traZODone (DESYREL) tablet 50 mg, 50 mg, Oral, Nightly, Isaiah Ram MD, 50 mg at 04/15/18 6638    ASSESSMENT & PLAN:    Active Problems:    Impulse control disorder (r/o derek)  Plan: Continue Trileptal 450 mg daily.  Continue working with the family on disposition.     Intellectual disability  Plan: It is still my recommendation with patient may do better in a [facility] setting compared to her current environment.  They may be able to provide more supervision.    Suicide precautions: Suicide precaution Level 3 (q15 min checks)     Behavioral Health Treatment Plan and Problem List: I have reviewed and approved the Behavioral Health Treatment Plan and Problem list.  The patient has had a chance to review and agrees with the treatment plan.     Clinician:  Gregg Cervantes MD  04/17/18  11:45 AM    This provider is located at [facility], Behavioral health, [address], [address] in [facility].  The patient is seen remotely at [facility], [address] [place] [address], using [employer], an encrypted service from one [facility] to another, with staff present. The patient's condition being diagnosed/treated is appropriate for telemedecine.  The provider identified himself and his credentials.     The patient and/or patient's guardian consent to be seen remotely, and when consent is given they understand that the consent allows for patient identifiable information to be sent to a third party as needed.  They may refuse to be seen remotely at any time.  The electronic data is encrypted and password protected, and the patient has been advised of the potential risks to privacy notwithstanding such measures.

## 2018-04-17 NOTE — PLAN OF CARE
Problem: Patient Care Overview  Goal: Plan of Care Review  Patient rated anxiety 3/10 and depression 2/10. Patient stated  he thought his medication was helping.    04/17/18 0336   Coping/Psychosocial   Plan of Care Reviewed With patient   Coping/Psychosocial   Patient Agreement with Plan of Care agrees with comment (describe)   Plan of Care Review   Progress no change       Problem: Overarching Goals (Adult)  Goal: Adheres to Safety Considerations for Self and Others    Intervention: Develop and Maintain Individualized Safety Plan   04/17/18 0336   Develop and Maintain Individualized Safety Plan   Safety Measures suicide check-in completed   Violence Risk   Feels Like Hurting Others no   Previous Attempt to Harm Others no   C-SSRS (Recent)   Wish to be Dead no   Suicidal Thoughts no   Suicidal Thought with Method No Plan/Intent no   Suicidal Intent (without Specific Plan) no   Suicide Intent with Specific Plan no   Describe Plan (Suicide Intent) no   Suicide Behavior no       Goal: Optimized Coping Skills in Response to Life Stressors    Intervention: Promote Effective Coping Strategies   04/17/18 0336   Coping/Psychosocial Interventions   Supportive Measures active listening utilized;verbalization of feelings encouraged;problem solving facilitated       Goal: Develops/Participates in Therapeutic [place] to Support Successful Transition    Intervention: Foster Therapeutic [place]   04/17/18 0336   Interventions   Trust Relationship/Rapport care explained;reassurance provided;thoughts/feelings acknowledged

## 2018-04-17 NOTE — PROGRESS NOTES
1210  Met with patient for individual today, she reports feeling better and says now that she wants to return home and reports that her mother is her best friend. She is aware that I talked to her mother about Dr Chiang recommendation for SCL placement. She does have multiple service providers involved including Adult protective services. Discussed the importance of safety when she is at home and letting her mother know who she contacts, she was agreeable.     Patient denies suicidal thoughts, she reports a decrease in anxiety and depression and does not rate. Patient telling her peers at breakfast today that she was pregnant she appears to be attention seeking.She continues to be talkative and hypersexual at times.     Continue stabilization individual and group therapy to focus on healthy coping skills and schedule follow up care. Patients mother says they plan to move her case to [facility] at her  suggestion.

## 2018-04-17 NOTE — PLAN OF CARE
Problem: Patient Care Overview  Goal: Plan of Care Review  Outcome: Ongoing (interventions implemented as appropriate)   04/17/18 1526   Coping/Psychosocial   Plan of Care Reviewed With patient   Coping/Psychosocial   Patient Agreement with Plan of Care agrees   Plan of Care Review   Progress improving   OTHER   Outcome Summary PT CONTINUES TO BE LOUD AND HYPERVERBAL. HYPERSEXUAL BEHAVIOR AT TIMES. PT DENIES ANY SI, HI, ANXIETY, DEPRESSION, OR A/V HALLUCINATIONS.        Problem: Overarching Goals (Adult)  Goal: Adheres to Safety Considerations for Self and Others  Outcome: Ongoing (interventions implemented as appropriate)    Goal: Optimized Coping Skills in Response to Life Stressors  Outcome: Ongoing (interventions implemented as appropriate)    Goal: Develops/Participates in Therapeutic [place] to Support Successful Transition  Outcome: Ongoing (interventions implemented as appropriate)

## 2018-04-18 PROCEDURE — 99231 SBSQ HOSP IP/OBS SF/LOW 25: CPT | Performed by: PSYCHIATRY & NEUROLOGY

## 2018-04-18 RX ORDER — OXCARBAZEPINE 300 MG/1
600 TABLET, FILM COATED ORAL 2 TIMES DAILY
Status: DISCONTINUED | OUTPATIENT
Start: 2018-04-18 | End: 2018-04-19 | Stop reason: HOSPADM

## 2018-04-18 RX ADMIN — TRAZODONE HYDROCHLORIDE 50 MG: 50 TABLET ORAL at 21:45

## 2018-04-18 RX ADMIN — NICOTINE 1 PATCH: 21 PATCH TRANSDERMAL at 08:04

## 2018-04-18 RX ADMIN — ONDANSETRON 4 MG: 4 TABLET, FILM COATED ORAL at 12:33

## 2018-04-18 RX ADMIN — OXCARBAZEPINE 600 MG: 300 TABLET, FILM COATED ORAL at 20:33

## 2018-04-18 RX ADMIN — OXCARBAZEPINE 450 MG: 300 TABLET, FILM COATED ORAL at 08:03
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
## 2018-04-18 NOTE — PLAN OF CARE
Problem: Patient Care Overview  Goal: Plan of Care Review  Outcome: Ongoing (interventions implemented as appropriate)  Pt. Verbalizes  slept all night is alert oriented to self ,place date she is talkative interacting with peers denies any s/i denies hallucinations loud @ times is easily redirected    04/18/18 1702   Coping/Psychosocial   Plan of Care Reviewed With patient   Coping/Psychosocial   Patient Agreement with Plan of Care agrees   Plan of Care Review   Progress improving       Problem: Overarching Goals (Adult)  Goal: Adheres to Safety Considerations for Self and Others  Outcome: Ongoing (interventions implemented as appropriate)    Goal: Optimized Coping Skills in Response to Life Stressors  Outcome: Ongoing (interventions implemented as appropriate)    Goal: Develops/Participates in Therapeutic [place] to Support Successful Transition  Outcome: Ongoing (interventions implemented as appropriate)

## 2018-04-18 NOTE — PROGRESS NOTES
"INPATIENT PSYCHIATRIC PROGRESS NOTE    Name:  Rosa Castro  :  2000  MRN:  [id number]  Visit Number:  [id number]  Length of stay:  3    SUBJECTIVE  CC: f/u impulse control     INTERVAL HISTORY:  Rosa was seen for follow-up today.  She reports she's feeling good and ready to go home.  She has no complaints.  On the unit she continued to be hyperverbal, loud, and hypersexual at times.  She denies any medication side effects.      Sleep: good    Review of Systems   Constitutional: Negative.    Respiratory: Negative.    Cardiovascular: Negative.    Gastrointestinal: Negative.    Genitourinary: Negative.    Musculoskeletal: Negative.    Neurological: Negative.    Psychiatric/Behavioral: Positive for behavioral problems. Negative for suicidal ideas.       OBJECTIVE    Temp:  [97.1 °F (36.2 °C)-98.5 °F (36.9 °C)] 98.5 °F (36.9 °C)  Heart Rate:  [] 100  Resp:  [18] 18  BP: (132-145)/(82-91) 132/82    MENTAL STATUS EXAM:  Appearance:Casually dressed, good hygeine.   Cooperation:Cooperative  Psychomotor: No psychomotor agitation/retardation, No EPS, No motor tics  Speech- talkative, loud  Mood \"I'm happy as heck\"   Affect- euphoric, not labile  Thought Content-goal directed, no delusional material present  Thought process-linear, organized.  Suicidality: No SI  Homicidality: No HI  Perception: No AH/VH  Insight- poor  Judgement- poor    Lab Results (last 24 hours)     ** No results found for the last 24 hours. **             Imaging Results (last 24 hours)     ** No results found for the last 24 hours. **             ECG/EMG Results (most recent)     Procedure Component Value Units Date/Time    ECG 12 Lead [[id number]] Collected:  04/15/18 0453     Updated:  04/15/18 1948    Narrative:       Test Reason : Potential adverse reaction to medications.  Blood Pressure : **/** mmHG  Vent. Rate : 076 BPM     Atrial Rate : 076 BPM     P-R Int : 136 ms          QRS Dur : 088 ms      QT Int : 386 ms       P-R-T " Axes : 029 034 017 degrees     QTc Int : 434 ms    Normal sinus rhythm  Normal ECG  When compared with ECG of 11-APR-2018 13:40,  No significant change was found  Confirmed by Darrell Mcrae ([id number]) on 4/15/2018 7:48:33 PM    Referred By:  KAVITHA           Confirmed By:Darrell Mcrae           ALLERGIES: Review of patient's allergies indicates no known allergies.      Current Facility-Administered Medications:   •  acetaminophen (TYLENOL) tablet 650 mg, 650 mg, Oral, Q4H PRN, Isaiah Ram MD  •  aluminum-magnesium hydroxide-simethicone (MAALOX MAX) 400-400-40 MG/5ML suspension 15 mL, 15 mL, Oral, Q6H PRN, Isaiah Ram MD  •  benzonatate (TESSALON) capsule 100 mg, 100 mg, Oral, TID PRN, Isaiah Ram MD  •  benztropine (COGENTIN) tablet 1 mg, 1 mg, Oral, Daily PRN **OR** benztropine (COGENTIN) injection 0.5 mg, 0.5 mg, Intramuscular, Daily PRN, Isaiah Ram MD  •  famotidine (PEPCID) tablet 20 mg, 20 mg, Oral, BID PRN, Isaiah Ram MD  •  hydrOXYzine (ATARAX) tablet 50 mg, 50 mg, Oral, Q6H PRN, Isaiah Ram MD  •  loperamide (IMODIUM) capsule 2 mg, 2 mg, Oral, 4x Daily PRN, Isaiah Ram MD  •  magnesium hydroxide (MILK OF MAGNESIA) suspension 2400 mg/10mL 10 mL, 10 mL, Oral, Daily PRN, Isaiah Ram MD  •  nicotine (NICODERM CQ) 21 MG/24HR patch 1 patch, 1 patch, Transdermal, Q24H, Isaiah Ram MD, 1 patch at 04/18/18 0804  •  ondansetron (ZOFRAN) tablet 4 mg, 4 mg, Oral, Q6H PRN, Isaiah Ram MD, 4 mg at 04/18/18 1233  •  OXcarbazepine (TRILEPTAL) tablet 450 mg, 450 mg, Oral, BID, Gregg Cervantes MD, 450 mg at 04/18/18 0803  •  sodium chloride (OCEAN) nasal spray 2 spray, 2 spray, Each Nare, PRN, Isaiah Ram MD  •  traZODone (DESYREL) tablet 50 mg, 50 mg, Oral, Nightly, Isaiah Ram MD, 50 mg at 04/17/18 8380    ASSESSMENT & PLAN:    Active Problems:    Impulse control disorder (r/o derek)  Plan: increase Trileptal 600 mg BID.  Continue working with the family on  disposition.     Intellectual disability  Plan: will continue outpt services.     Suicide precautions: Suicide precaution Level 3 (q15 min checks)     Behavioral Health Treatment Plan and Problem List: I have reviewed and approved the Behavioral Health Treatment Plan and Problem list.  The patient has had a chance to review and agrees with the treatment plan.     Clinician:  Gregg Cervantes MD  04/18/18  2:37 PM

## 2018-04-18 NOTE — PLAN OF CARE
Problem: Patient Care Overview  Goal: Plan of Care Review  Outcome: Ongoing (interventions implemented as appropriate)   04/18/18 0040   Coping/Psychosocial   Plan of Care Reviewed With patient   Coping/Psychosocial   Patient Agreement with Plan of Care agrees   Plan of Care Review   Progress no change   OTHER   Outcome Summary Pt continues to be loud, hyperverbal. Hypersexual behavior at times. Pt denies anxiety depression SI HI hallucinations       Problem: Overarching Goals (Adult)  Goal: Adheres to Safety Considerations for Self and Others  Outcome: Ongoing (interventions implemented as appropriate)    Goal: Optimized Coping Skills in Response to Life Stressors  Outcome: Ongoing (interventions implemented as appropriate)    Goal: Develops/Participates in Therapeutic [place] to Support Successful Transition  Outcome: Ongoing (interventions implemented as appropriate)

## 2018-04-19 VITALS
DIASTOLIC BLOOD PRESSURE: 74 MMHG | HEART RATE: 82 BPM | OXYGEN SATURATION: 98 % | HEIGHT: 59 IN | RESPIRATION RATE: 16 BRPM | SYSTOLIC BLOOD PRESSURE: 114 MMHG | BODY MASS INDEX: 40.12 KG/M2 | WEIGHT: 199 LBS | TEMPERATURE: 98.2 F

## 2018-04-19 PROCEDURE — 99238 HOSP IP/OBS DSCHRG MGMT 30/<: CPT | Performed by: PSYCHIATRY & NEUROLOGY

## 2018-04-19 RX ORDER — OXCARBAZEPINE 600 MG/1
600 TABLET, FILM COATED ORAL 2 TIMES DAILY
Qty: 60 TABLET | Refills: 0 | Status: ON HOLD | OUTPATIENT
Start: 2018-04-19 | End: 2018-04-26

## 2018-04-19 RX ADMIN — OXCARBAZEPINE 600 MG: 300 TABLET, FILM COATED ORAL at 08:30
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
## 2018-04-19 NOTE — DISCHARGE INSTR - APPOINTMENTS
[facility] [place] [id number]  [facility] [address]  [phone]    April 23 2018 at 10:30am with Chelsy

## 2018-04-19 NOTE — PLAN OF CARE
Problem: Patient Care Overview  Goal: Plan of Care Review  Outcome: Ongoing (interventions implemented as appropriate)   04/19/18 0137   Coping/Psychosocial   Plan of Care Reviewed With patient   Coping/Psychosocial   Patient Agreement with Plan of Care agrees   Plan of Care Review   Progress no change   OTHER   Outcome Summary Pt loud hyperverbal, hypersexual behavior at times. Pt denies anxiety depression SI HI hallucinations       Problem: Overarching Goals (Adult)  Goal: Adheres to Safety Considerations for Self and Others  Outcome: Ongoing (interventions implemented as appropriate)    Goal: Optimized Coping Skills in Response to Life Stressors  Outcome: Ongoing (interventions implemented as appropriate)    Goal: Develops/Participates in Therapeutic [place] to Support Successful Transition  Outcome: Ongoing (interventions implemented as appropriate)

## 2018-04-19 NOTE — PROGRESS NOTES
"1035  DATA:  Therapist met with Patient in dayroom lobby this date. Therapist introduced self as covering therapist and explained that her primary therapist will not be in today. Patient agreeable to discuss treatment process and discharge concerns. Patient reports that she completed treatment today and will be returning home. Patient reported readiness for discharge with plans to comply with aftercare services. Patient agreeable to seek treatment if SI/HI reoccur.     1153  Therapist contacted Patient's guardian Nita Khan this date for disposition and safety. Nita gave verbal consent this date for Patient to follow-up with [facility] due to [place] \"being a joke\". Nita confirmed that Patient would be returning to a safe home and completed safety planning. Nita reports that she will be providing transportation this date.      ASSESSMENT:  Patient appeared euphoric and joyful today. Patient observed to smile and laugh appropriately. Patient seems to have organized and goal-oriented thought content. Patient denies any depression, anxiety, or SI/HI at this time.     PLAN:  Patient completed treatment this date and has been discharged.     Patient will follow-up with [facility] for aftercare services.     Transportation assistance is not needed. Patient's guardian to provided.   "

## 2018-04-20 ENCOUNTER — HOSPITAL ENCOUNTER (EMERGENCY)
Facility: HOSPITAL | Age: 18
Discharge: PSYCHIATRIC OR SUBSTANCE USE FACILITY | End: 2018-04-20
Attending: EMERGENCY MEDICINE | Admitting: EMERGENCY MEDICINE

## 2018-04-20 ENCOUNTER — HOSPITAL ENCOUNTER (INPATIENT)
Facility: HOSPITAL | Age: 18
LOS: 6 days | Discharge: HOME | End: 2018-04-26
Attending: PSYCHIATRY & NEUROLOGY | Admitting: PSYCHIATRY & NEUROLOGY

## 2018-04-20 VITALS
WEIGHT: 202 LBS | BODY MASS INDEX: 40.72 KG/M2 | OXYGEN SATURATION: 96 % | DIASTOLIC BLOOD PRESSURE: 76 MMHG | SYSTOLIC BLOOD PRESSURE: 129 MMHG | HEART RATE: 85 BPM | TEMPERATURE: 99.6 F | RESPIRATION RATE: 18 BRPM | HEIGHT: 59 IN

## 2018-04-20 DIAGNOSIS — F32.A DEPRESSION, UNSPECIFIED DEPRESSION TYPE: Primary | ICD-10-CM

## 2018-04-20 PROBLEM — F32.9 MDD (MAJOR DEPRESSIVE DISORDER): Status: ACTIVE | Noted: 2018-04-20

## 2018-04-20 LAB
6-ACETYL MORPHINE: NEGATIVE
ALBUMIN SERPL-MCNC: 4.7 G/DL (ref 3.5–5)
ALBUMIN/GLOB SERPL: 1.4 G/DL (ref 1.5–2.5)
ALP SERPL-CCNC: 92 U/L (ref 35–104)
ALT SERPL W P-5'-P-CCNC: 21 U/L (ref 10–36)
AMPHET+METHAMPHET UR QL: NEGATIVE
ANION GAP SERPL CALCULATED.3IONS-SCNC: 5.6 MMOL/L (ref 3.6–11.2)
AST SERPL-CCNC: 22 U/L (ref 10–30)
B-HCG UR QL: NEGATIVE
BARBITURATES UR QL SCN: NEGATIVE
BASOPHILS # BLD AUTO: 0.02 10*3/MM3 (ref 0–0.3)
BASOPHILS NFR BLD AUTO: 0.2 % (ref 0–2)
BENZODIAZ UR QL SCN: NEGATIVE
BILIRUB SERPL-MCNC: 0.2 MG/DL (ref 0.2–1.8)
BILIRUB UR QL STRIP: NEGATIVE
BUN BLD-MCNC: 16 MG/DL (ref 7–21)
BUN/CREAT SERPL: 16.8 (ref 7–25)
BUPRENORPHINE SERPL-MCNC: NEGATIVE NG/ML
CALCIUM SPEC-SCNC: 9.6 MG/DL (ref 7.7–10)
CANNABINOIDS SERPL QL: NEGATIVE
CHLORIDE SERPL-SCNC: 108 MMOL/L (ref 99–112)
CLARITY UR: CLEAR
CO2 SERPL-SCNC: 26.4 MMOL/L (ref 24.3–31.9)
COCAINE UR QL: NEGATIVE
COLOR UR: YELLOW
CREAT BLD-MCNC: 0.95 MG/DL (ref 0.43–1.29)
DEPRECATED RDW RBC AUTO: 43.9 FL (ref 37–54)
EOSINOPHIL # BLD AUTO: 0.03 10*3/MM3 (ref 0–0.7)
EOSINOPHIL NFR BLD AUTO: 0.3 % (ref 0–5)
ERYTHROCYTE [DISTWIDTH] IN BLOOD BY AUTOMATED COUNT: 14.2 % (ref 11.5–14.5)
ETHANOL BLD-MCNC: <10 MG/DL
ETHANOL UR QL: <0.01 %
GFR SERPL CREATININE-BSD FRML MDRD: 76 ML/MIN/1.73
GLOBULIN UR ELPH-MCNC: 3.4 GM/DL
GLUCOSE BLD-MCNC: 109 MG/DL (ref 70–110)
GLUCOSE UR STRIP-MCNC: NEGATIVE MG/DL
HCT VFR BLD AUTO: 39.4 % (ref 37–47)
HGB BLD-MCNC: 13 G/DL (ref 12–16)
HGB UR QL STRIP.AUTO: NEGATIVE
IMM GRANULOCYTES # BLD: 0.03 10*3/MM3 (ref 0–0.03)
IMM GRANULOCYTES NFR BLD: 0.3 % (ref 0–0.5)
KETONES UR QL STRIP: NEGATIVE
LEUKOCYTE ESTERASE UR QL STRIP.AUTO: NEGATIVE
LYMPHOCYTES # BLD AUTO: 1.52 10*3/MM3 (ref 1–3)
LYMPHOCYTES NFR BLD AUTO: 15.3 % (ref 21–51)
MCH RBC QN AUTO: 28.4 PG (ref 27–33)
MCHC RBC AUTO-ENTMCNC: 33 G/DL (ref 33–37)
MCV RBC AUTO: 86 FL (ref 80–94)
METHADONE UR QL SCN: NEGATIVE
MONOCYTES # BLD AUTO: 0.34 10*3/MM3 (ref 0.1–0.9)
MONOCYTES NFR BLD AUTO: 3.4 % (ref 0–10)
NEUTROPHILS # BLD AUTO: 8.01 10*3/MM3 (ref 1.4–6.5)
NEUTROPHILS NFR BLD AUTO: 80.5 % (ref 30–70)
NITRITE UR QL STRIP: NEGATIVE
OPIATES UR QL: NEGATIVE
OSMOLALITY SERPL CALC.SUM OF ELEC: 281.2 MOSM/KG (ref 273–305)
OXYCODONE UR QL SCN: NEGATIVE
PCP UR QL SCN: NEGATIVE
PH UR STRIP.AUTO: <=5 [PH] (ref 5–8)
PLATELET # BLD AUTO: 347 10*3/MM3 (ref 130–400)
PMV BLD AUTO: 9.4 FL (ref 6–10)
POTASSIUM BLD-SCNC: 3.9 MMOL/L (ref 3.5–5.3)
PROT SERPL-MCNC: 8.1 G/DL (ref 6–8)
PROT UR QL STRIP: NEGATIVE
RBC # BLD AUTO: 4.58 10*6/MM3 (ref 4.2–5.4)
SODIUM BLD-SCNC: 140 MMOL/L (ref 135–153)
SP GR UR STRIP: 1.03 (ref 1–1.03)
UROBILINOGEN UR QL STRIP: NORMAL
WBC NRBC COR # BLD: 9.95 10*3/MM3 (ref 4.5–12.5)

## 2018-04-20 PROCEDURE — 80307 DRUG TEST PRSMV CHEM ANLYZR: CPT | Performed by: EMERGENCY MEDICINE

## 2018-04-20 PROCEDURE — 81003 URINALYSIS AUTO W/O SCOPE: CPT | Performed by: EMERGENCY MEDICINE

## 2018-04-20 PROCEDURE — 85025 COMPLETE CBC W/AUTO DIFF WBC: CPT | Performed by: EMERGENCY MEDICINE

## 2018-04-20 PROCEDURE — 99284 EMERGENCY DEPT VISIT MOD MDM: CPT

## 2018-04-20 PROCEDURE — 81025 URINE PREGNANCY TEST: CPT | Performed by: EMERGENCY MEDICINE

## 2018-04-20 PROCEDURE — 80053 COMPREHEN METABOLIC PANEL: CPT | Performed by: EMERGENCY MEDICINE

## 2018-04-20 RX ORDER — ONDANSETRON 4 MG/1
4 TABLET, FILM COATED ORAL EVERY 6 HOURS PRN
Status: DISCONTINUED | OUTPATIENT
Start: 2018-04-20 | End: 2018-04-26 | Stop reason: HOSPADM

## 2018-04-20 RX ORDER — TRAZODONE HYDROCHLORIDE 50 MG/1
50 TABLET ORAL NIGHTLY
Status: DISCONTINUED | OUTPATIENT
Start: 2018-04-20 | End: 2018-04-26 | Stop reason: HOSPADM

## 2018-04-20 RX ORDER — TRAZODONE HYDROCHLORIDE 50 MG/1
50 TABLET ORAL NIGHTLY PRN
Status: DISCONTINUED | OUTPATIENT
Start: 2018-04-20 | End: 2018-04-26 | Stop reason: HOSPADM

## 2018-04-20 RX ORDER — ALUMINA, MAGNESIA, AND SIMETHICONE 2400; 2400; 240 MG/30ML; MG/30ML; MG/30ML
15 SUSPENSION ORAL EVERY 6 HOURS PRN
Status: DISCONTINUED | OUTPATIENT
Start: 2018-04-20 | End: 2018-04-26 | Stop reason: HOSPADM

## 2018-04-20 RX ORDER — HYDROXYZINE 50 MG/1
50 TABLET, FILM COATED ORAL NIGHTLY PRN
Status: DISCONTINUED | OUTPATIENT
Start: 2018-04-20 | End: 2018-04-26 | Stop reason: HOSPADM

## 2018-04-20 RX ORDER — BENZTROPINE MESYLATE 1 MG/1
1 TABLET ORAL DAILY PRN
Status: DISCONTINUED | OUTPATIENT
Start: 2018-04-20 | End: 2018-04-26 | Stop reason: HOSPADM

## 2018-04-20 RX ORDER — BENZTROPINE MESYLATE 1 MG/ML
0.5 INJECTION INTRAMUSCULAR; INTRAVENOUS DAILY PRN
Status: DISCONTINUED | OUTPATIENT
Start: 2018-04-20 | End: 2018-04-26 | Stop reason: HOSPADM

## 2018-04-20 RX ORDER — OXCARBAZEPINE 300 MG/1
600 TABLET, FILM COATED ORAL 2 TIMES DAILY
Status: DISCONTINUED | OUTPATIENT
Start: 2018-04-20 | End: 2018-04-26 | Stop reason: HOSPADM

## 2018-04-20 RX ORDER — FAMOTIDINE 20 MG/1
20 TABLET, FILM COATED ORAL 2 TIMES DAILY PRN
Status: DISCONTINUED | OUTPATIENT
Start: 2018-04-20 | End: 2018-04-26 | Stop reason: HOSPADM

## 2018-04-20 RX ORDER — OXCARBAZEPINE 300 MG/1
600 TABLET, FILM COATED ORAL 2 TIMES DAILY
Status: CANCELLED | OUTPATIENT
Start: 2018-04-20

## 2018-04-20 RX ORDER — NICOTINE 21 MG/24HR
1 PATCH, TRANSDERMAL 24 HOURS TRANSDERMAL EVERY 24 HOURS
Status: DISCONTINUED | OUTPATIENT
Start: 2018-04-21 | End: 2018-04-22

## 2018-04-20 RX ORDER — TRAZODONE HYDROCHLORIDE 50 MG/1
50 TABLET ORAL NIGHTLY
Status: CANCELLED | OUTPATIENT
Start: 2018-04-20

## 2018-04-20 RX ORDER — BENZONATATE 100 MG/1
100 CAPSULE ORAL 3 TIMES DAILY PRN
Status: DISCONTINUED | OUTPATIENT
Start: 2018-04-20 | End: 2018-04-26 | Stop reason: HOSPADM

## 2018-04-20 RX ORDER — IBUPROFEN 600 MG/1
600 TABLET ORAL EVERY 6 HOURS PRN
Status: DISCONTINUED | OUTPATIENT
Start: 2018-04-20 | End: 2018-04-26 | Stop reason: HOSPADM

## 2018-04-20 RX ORDER — ECHINACEA PURPUREA EXTRACT 125 MG
2 TABLET ORAL AS NEEDED
Status: DISCONTINUED | OUTPATIENT
Start: 2018-04-20 | End: 2018-04-26 | Stop reason: HOSPADM

## 2018-04-20 RX ORDER — LOPERAMIDE HYDROCHLORIDE 2 MG/1
2 CAPSULE ORAL 4 TIMES DAILY PRN
Status: DISCONTINUED | OUTPATIENT
Start: 2018-04-20 | End: 2018-04-26 | Stop reason: HOSPADM

## 2018-04-20 RX ADMIN — OXCARBAZEPINE 600 MG: 300 TABLET, FILM COATED ORAL at 21:10

## 2018-04-20 RX ADMIN — TRAZODONE HYDROCHLORIDE 50 MG: 50 TABLET ORAL at 21:10

## 2018-04-20 NOTE — ED PROVIDER NOTES
Subjective     Mental Health Problem   Presenting symptoms: agitation and depression    Presenting symptoms: no hallucinations, no homicidal ideas and no suicidal thoughts    Degree of incapacity (severity):  Moderate  Onset quality:  Gradual  Timing:  Constant  Progression:  Worsening  Chronicity:  Recurrent  Relieved by:  Nothing  Associated symptoms: anxiety, feelings of worthlessness and insomnia    Associated symptoms: no abdominal pain and no chest pain        Review of Systems   Constitutional: Negative.  Negative for fever.   HENT: Negative.    Respiratory: Negative.    Cardiovascular: Negative.  Negative for chest pain.   Gastrointestinal: Negative.  Negative for abdominal pain.   Endocrine: Negative.    Genitourinary: Negative.  Negative for dysuria.   Skin: Negative.    Neurological: Negative.    Psychiatric/Behavioral: Positive for agitation. Negative for hallucinations, homicidal ideas and suicidal ideas. The patient is nervous/anxious and has insomnia.    All other systems reviewed and are negative.      Past Medical History:   Diagnosis Date   • Anxiety    • Depression    • Intellectual disability        No Known Allergies    Past Surgical History:   Procedure Laterality Date   • EYE SURGERY Bilateral     childhood       Family History   Problem Relation Age of Onset   • Family history unknown: Yes       Social History     Social History   • Marital status: Single     Social History Main Topics   • Smoking status: Current Every Day Smoker     Packs/day: 1.00     Years: 1.00     Types: Cigarettes   • Smokeless tobacco: Never Used   • Alcohol use No   • Drug use: No   • Sexual activity: Not Currently     Partners: Male     Other Topics Concern   • Not on file           Objective   Physical Exam   Constitutional: She is oriented to person, place, and time. She appears well-developed and well-nourished. No distress.   HENT:   Head: Normocephalic and atraumatic.   Right Ear: External ear normal.   Left  Ear: External ear normal.   Nose: Nose normal.   Eyes: Conjunctivae and EOM are normal. Pupils are equal, round, and reactive to light.   Neck: Normal range of motion. Neck supple. No JVD present. No tracheal deviation present.   Cardiovascular: Normal rate, regular rhythm and normal heart sounds.    No murmur heard.  Pulmonary/Chest: Effort normal and breath sounds normal. No respiratory distress. She has no wheezes.   Abdominal: Soft. Bowel sounds are normal. There is no tenderness.   Musculoskeletal: Normal range of motion. She exhibits no edema or deformity.   Neurological: She is alert and oriented to person, place, and time. No cranial nerve deficit.   Skin: Skin is warm and dry. No rash noted. She is not diaphoretic. No erythema. No pallor.   Psychiatric: She has a normal mood and affect. Her behavior is normal. Thought content normal.   Nursing note and vitals reviewed.      Procedures         ED Course  ED Course                  MDM  Number of Diagnoses or Management Options  Depression, unspecified depression type: established and worsening     Amount and/or Complexity of Data Reviewed  Clinical lab tests: reviewed and ordered  Discuss the patient with other providers: yes    Risk of Complications, Morbidity, and/or Mortality  Presenting problems: moderate        Final diagnoses:   Depression, unspecified depression type            ISSA Stoll  04/20/18 1954

## 2018-04-20 NOTE — NURSING NOTE
Patient search completed per policy. Patient is smiling during assessment and laughing when she states she is suicidal and wants to hurt her mother. Pt stated she would never hurt her mother because she does love her, she just gets on her nerves. Pt stated she has no plan for suicide just thoughts. Pt stated she alwasys has these thoughts.,

## 2018-04-20 NOTE — NURSING NOTE
Patient remains calm in ED, laughing and talking with intake staff. Patient is not voicing any SI at this time. Patient says that she was really upset today. She reports that when she gets upset she says things and looses her cool.

## 2018-04-20 NOTE — NURSING NOTE
Patient in safe room and came out to intake desk and told intake staff that she is happy because she gets to go home and she is going to get to kill herself(laughing) notified ed provider and psychiatrist.

## 2018-04-20 NOTE — NURSING NOTE
Called and spoke to Dr. Diane. Reviewed information and notes from previous admissions that she does not feel the patient would benefit from inpt admission. Due to pt not having any plan and hx of her behavior she would refer mother to follow up as previously instructed at the end of her last visit. Pt and ed provider made aware of plan of care. Patient states that she is glad she is going home and happy.

## 2018-04-21 PROCEDURE — 93005 ELECTROCARDIOGRAM TRACING: CPT | Performed by: PSYCHIATRY & NEUROLOGY

## 2018-04-21 PROCEDURE — 99221 1ST HOSP IP/OBS SF/LOW 40: CPT | Performed by: PSYCHIATRY & NEUROLOGY

## 2018-04-21 PROCEDURE — 93010 ELECTROCARDIOGRAM REPORT: CPT | Performed by: INTERNAL MEDICINE

## 2018-04-21 RX ORDER — HYDROXYZINE HYDROCHLORIDE 25 MG/1
25 TABLET, FILM COATED ORAL 3 TIMES DAILY PRN
Status: DISCONTINUED | OUTPATIENT
Start: 2018-04-21 | End: 2018-04-26 | Stop reason: HOSPADM

## 2018-04-21 RX ADMIN — TRAZODONE HYDROCHLORIDE 50 MG: 50 TABLET ORAL at 21:33

## 2018-04-21 RX ADMIN — OXCARBAZEPINE 600 MG: 300 TABLET, FILM COATED ORAL at 21:33

## 2018-04-21 RX ADMIN — ONDANSETRON 4 MG: 4 TABLET, FILM COATED ORAL at 13:42

## 2018-04-21 RX ADMIN — OXCARBAZEPINE 600 MG: 300 TABLET, FILM COATED ORAL at 08:06

## 2018-04-21 RX ADMIN — NICOTINE 1 PATCH: 21 PATCH TRANSDERMAL at 08:06

## 2018-04-22 PROCEDURE — 99231 SBSQ HOSP IP/OBS SF/LOW 25: CPT | Performed by: PSYCHIATRY & NEUROLOGY

## 2018-04-22 RX ADMIN — NICOTINE 1 PATCH: 21 PATCH TRANSDERMAL at 08:07

## 2018-04-22 RX ADMIN — TRAZODONE HYDROCHLORIDE 50 MG: 50 TABLET ORAL at 21:03

## 2018-04-22 RX ADMIN — NICOTINE POLACRILEX 2 MG: 2 GUM, CHEWING ORAL at 21:03

## 2018-04-22 RX ADMIN — NICOTINE POLACRILEX 2 MG: 2 GUM, CHEWING ORAL at 17:08

## 2018-04-22 RX ADMIN — OXCARBAZEPINE 600 MG: 300 TABLET, FILM COATED ORAL at 21:03

## 2018-04-22 RX ADMIN — OXCARBAZEPINE 600 MG: 300 TABLET, FILM COATED ORAL at 08:07

## 2018-04-23 PROBLEM — F32.9 MDD (MAJOR DEPRESSIVE DISORDER): Status: RESOLVED | Noted: 2018-04-20 | Resolved: 2018-04-23

## 2018-04-23 PROCEDURE — 99231 SBSQ HOSP IP/OBS SF/LOW 25: CPT | Performed by: PSYCHIATRY & NEUROLOGY

## 2018-04-23 RX ADMIN — OXCARBAZEPINE 600 MG: 300 TABLET, FILM COATED ORAL at 10:23

## 2018-04-23 RX ADMIN — NICOTINE POLACRILEX 2 MG: 2 GUM, CHEWING ORAL at 21:49

## 2018-04-23 RX ADMIN — NICOTINE POLACRILEX 2 MG: 2 GUM, CHEWING ORAL at 14:18

## 2018-04-23 RX ADMIN — NICOTINE POLACRILEX 2 MG: 2 GUM, CHEWING ORAL at 17:22

## 2018-04-23 RX ADMIN — TRAZODONE HYDROCHLORIDE 50 MG: 50 TABLET ORAL at 21:49

## 2018-04-23 RX ADMIN — OXCARBAZEPINE 600 MG: 300 TABLET, FILM COATED ORAL at 20:40

## 2018-04-23 RX ADMIN — NICOTINE POLACRILEX 2 MG: 2 GUM, CHEWING ORAL at 10:23

## 2018-04-24 PROCEDURE — 99231 SBSQ HOSP IP/OBS SF/LOW 25: CPT | Performed by: PSYCHIATRY & NEUROLOGY

## 2018-04-24 RX ORDER — GUANFACINE 1 MG/1
1 TABLET ORAL 2 TIMES DAILY
Status: DISCONTINUED | OUTPATIENT
Start: 2018-04-24 | End: 2018-04-26 | Stop reason: HOSPADM

## 2018-04-24 RX ADMIN — OXCARBAZEPINE 600 MG: 300 TABLET, FILM COATED ORAL at 20:00

## 2018-04-24 RX ADMIN — GUANFACINE HYDROCHLORIDE 1 MG: 1 TABLET ORAL at 20:00

## 2018-04-24 RX ADMIN — NICOTINE POLACRILEX 2 MG: 2 GUM, CHEWING ORAL at 18:06

## 2018-04-24 RX ADMIN — NICOTINE POLACRILEX 2 MG: 2 GUM, CHEWING ORAL at 09:55

## 2018-04-24 RX ADMIN — TRAZODONE HYDROCHLORIDE 50 MG: 50 TABLET ORAL at 21:47

## 2018-04-24 RX ADMIN — OXCARBAZEPINE 600 MG: 300 TABLET, FILM COATED ORAL at 08:05

## 2018-04-24 RX ADMIN — NICOTINE POLACRILEX 2 MG: 2 GUM, CHEWING ORAL at 08:06

## 2018-04-24 RX ADMIN — GUANFACINE HYDROCHLORIDE 1 MG: 1 TABLET ORAL at 14:18

## 2018-04-24 RX ADMIN — NICOTINE POLACRILEX 2 MG: 2 GUM, CHEWING ORAL at 14:18

## 2018-04-25 PROCEDURE — 99232 SBSQ HOSP IP/OBS MODERATE 35: CPT | Performed by: PSYCHIATRY & NEUROLOGY

## 2018-04-25 RX ADMIN — NICOTINE POLACRILEX 2 MG: 2 GUM, CHEWING ORAL at 08:26

## 2018-04-25 RX ADMIN — OXCARBAZEPINE 600 MG: 300 TABLET, FILM COATED ORAL at 20:35

## 2018-04-25 RX ADMIN — TRAZODONE HYDROCHLORIDE 50 MG: 50 TABLET ORAL at 22:31

## 2018-04-25 RX ADMIN — GUANFACINE HYDROCHLORIDE 1 MG: 1 TABLET ORAL at 08:28

## 2018-04-25 RX ADMIN — GUANFACINE HYDROCHLORIDE 1 MG: 1 TABLET ORAL at 20:35

## 2018-04-25 RX ADMIN — NICOTINE POLACRILEX 2 MG: 2 GUM, CHEWING ORAL at 20:35

## 2018-04-25 RX ADMIN — HYDROXYZINE HYDROCHLORIDE 50 MG: 50 TABLET ORAL at 22:31

## 2018-04-25 RX ADMIN — NICOTINE POLACRILEX 2 MG: 2 GUM, CHEWING ORAL at 16:49

## 2018-04-25 RX ADMIN — OXCARBAZEPINE 600 MG: 300 TABLET, FILM COATED ORAL at 08:26

## 2018-04-26 VITALS
RESPIRATION RATE: 18 BRPM | WEIGHT: 199 LBS | HEART RATE: 88 BPM | BODY MASS INDEX: 41.77 KG/M2 | HEIGHT: 58 IN | SYSTOLIC BLOOD PRESSURE: 108 MMHG | DIASTOLIC BLOOD PRESSURE: 66 MMHG | TEMPERATURE: 98.4 F | OXYGEN SATURATION: 99 %

## 2018-04-26 PROCEDURE — 99238 HOSP IP/OBS DSCHRG MGMT 30/<: CPT | Performed by: PSYCHIATRY & NEUROLOGY

## 2018-04-26 RX ORDER — OXCARBAZEPINE 600 MG/1
600 TABLET, FILM COATED ORAL 2 TIMES DAILY
Qty: 60 TABLET | Refills: 0 | Status: SHIPPED | OUTPATIENT
Start: 2018-04-26

## 2018-04-26 RX ORDER — GUANFACINE 1 MG/1
1 TABLET ORAL 2 TIMES DAILY
Qty: 60 TABLET | Refills: 0 | Status: SHIPPED | OUTPATIENT
Start: 2018-04-26

## 2018-04-26 RX ORDER — TRAZODONE HYDROCHLORIDE 50 MG/1
50 TABLET ORAL NIGHTLY
Qty: 30 TABLET | Refills: 0 | Status: SHIPPED | OUTPATIENT
Start: 2018-04-26

## 2018-04-26 RX ADMIN — NICOTINE POLACRILEX 2 MG: 2 GUM, CHEWING ORAL at 14:26

## 2018-04-26 RX ADMIN — GUANFACINE HYDROCHLORIDE 1 MG: 1 TABLET ORAL at 08:31

## 2018-04-26 RX ADMIN — NICOTINE POLACRILEX 2 MG: 2 GUM, CHEWING ORAL at 08:32

## 2018-04-26 RX ADMIN — OXCARBAZEPINE 600 MG: 300 TABLET, FILM COATED ORAL at 08:31

## 2018-11-09 ENCOUNTER — APPOINTMENT (OUTPATIENT)
Dept: ULTRASOUND IMAGING | Facility: HOSPITAL | Age: 18
End: 2018-11-09

## 2018-11-09 ENCOUNTER — HOSPITAL ENCOUNTER (OUTPATIENT)
Facility: HOSPITAL | Age: 18
Discharge: HOME | End: 2018-11-09
Attending: OBSTETRICS & GYNECOLOGY | Admitting: OBSTETRICS & GYNECOLOGY

## 2018-11-09 ENCOUNTER — HOSPITAL ENCOUNTER (INPATIENT)
Facility: HOSPITAL | Age: 18
LOS: 4 days | Discharge: HOME | End: 2018-11-13
Attending: PSYCHIATRY & NEUROLOGY | Admitting: PSYCHIATRY & NEUROLOGY

## 2018-11-09 ENCOUNTER — HOSPITAL ENCOUNTER (EMERGENCY)
Facility: HOSPITAL | Age: 18
Discharge: PSYCHIATRIC OR SUBSTANCE USE FACILITY | End: 2018-11-09
Attending: EMERGENCY MEDICINE | Admitting: EMERGENCY MEDICINE

## 2018-11-09 VITALS
OXYGEN SATURATION: 99 % | WEIGHT: 218 LBS | RESPIRATION RATE: 18 BRPM | BODY MASS INDEX: 43.95 KG/M2 | TEMPERATURE: 97.4 F | HEIGHT: 59 IN | DIASTOLIC BLOOD PRESSURE: 82 MMHG | HEART RATE: 84 BPM | SYSTOLIC BLOOD PRESSURE: 126 MMHG

## 2018-11-09 VITALS
HEIGHT: 59 IN | HEART RATE: 97 BPM | DIASTOLIC BLOOD PRESSURE: 79 MMHG | WEIGHT: 218 LBS | BODY MASS INDEX: 43.95 KG/M2 | RESPIRATION RATE: 20 BRPM | SYSTOLIC BLOOD PRESSURE: 134 MMHG | TEMPERATURE: 97.8 F

## 2018-11-09 DIAGNOSIS — F99 PSYCHIATRIC DISORDER: Primary | ICD-10-CM

## 2018-11-09 PROBLEM — Z34.90 PREGNANCY: Status: ACTIVE | Noted: 2018-11-09

## 2018-11-09 PROBLEM — F32.9 MDD (MAJOR DEPRESSIVE DISORDER): Status: ACTIVE | Noted: 2018-11-09

## 2018-11-09 LAB
6-ACETYL MORPHINE: NEGATIVE
ALBUMIN SERPL-MCNC: 4.8 G/DL (ref 3.5–5)
ALBUMIN/GLOB SERPL: 1.5 G/DL (ref 1.5–2.5)
ALP SERPL-CCNC: 101 U/L (ref 35–104)
ALT SERPL W P-5'-P-CCNC: 27 U/L (ref 10–36)
AMPHET+METHAMPHET UR QL: NEGATIVE
ANION GAP SERPL CALCULATED.3IONS-SCNC: 6.1 MMOL/L (ref 3.6–11.2)
APAP SERPL-MCNC: <10 MCG/ML (ref 0–200)
AST SERPL-CCNC: 23 U/L (ref 10–30)
B-HCG UR QL: NEGATIVE
BARBITURATES UR QL SCN: NEGATIVE
BASOPHILS # BLD AUTO: 0.01 10*3/MM3 (ref 0–0.3)
BASOPHILS NFR BLD AUTO: 0.1 % (ref 0–2)
BENZODIAZ UR QL SCN: NEGATIVE
BILIRUB SERPL-MCNC: 0.4 MG/DL (ref 0.2–1.8)
BILIRUB UR QL STRIP: NEGATIVE
BILIRUB UR QL STRIP: NEGATIVE
BUN BLD-MCNC: 12 MG/DL (ref 7–21)
BUN/CREAT SERPL: 15.2 (ref 7–25)
BUPRENORPHINE SERPL-MCNC: NEGATIVE NG/ML
CALCIUM SPEC-SCNC: 9.5 MG/DL (ref 7.7–10)
CANNABINOIDS SERPL QL: NEGATIVE
CHLORIDE SERPL-SCNC: 106 MMOL/L (ref 99–112)
CLARITY UR: CLEAR
CLARITY UR: CLEAR
CO2 SERPL-SCNC: 25.9 MMOL/L (ref 24.3–31.9)
COCAINE UR QL: NEGATIVE
COLOR UR: YELLOW
COLOR UR: YELLOW
CREAT BLD-MCNC: 0.79 MG/DL (ref 0.43–1.29)
DEPRECATED RDW RBC AUTO: 42.2 FL (ref 37–54)
DEPRECATED RDW RBC AUTO: 42.4 FL (ref 37–54)
EOSINOPHIL # BLD AUTO: 0.18 10*3/MM3 (ref 0–0.7)
EOSINOPHIL NFR BLD AUTO: 1.9 % (ref 0–5)
ERYTHROCYTE [DISTWIDTH] IN BLOOD BY AUTOMATED COUNT: 13.1 % (ref 11.5–14.5)
ERYTHROCYTE [DISTWIDTH] IN BLOOD BY AUTOMATED COUNT: 13.1 % (ref 11.5–14.5)
ETHANOL BLD-MCNC: <10 MG/DL
ETHANOL UR QL: <0.01 %
GFR SERPL CREATININE-BSD FRML MDRD: 94 ML/MIN/1.73
GLOBULIN UR ELPH-MCNC: 3.3 GM/DL
GLUCOSE BLD-MCNC: 84 MG/DL (ref 70–110)
GLUCOSE UR STRIP-MCNC: NEGATIVE MG/DL
GLUCOSE UR STRIP-MCNC: NEGATIVE MG/DL
HCT VFR BLD AUTO: 39.2 % (ref 37–47)
HCT VFR BLD AUTO: 39.6 % (ref 37–47)
HGB BLD-MCNC: 12.6 G/DL (ref 12–16)
HGB BLD-MCNC: 12.8 G/DL (ref 12–16)
HGB UR QL STRIP.AUTO: NEGATIVE
HGB UR QL STRIP.AUTO: NEGATIVE
IMM GRANULOCYTES # BLD: 0.03 10*3/MM3 (ref 0–0.03)
IMM GRANULOCYTES NFR BLD: 0.3 % (ref 0–0.5)
KETONES UR QL STRIP: NEGATIVE
KETONES UR QL STRIP: NEGATIVE
LEUKOCYTE ESTERASE UR QL STRIP.AUTO: NEGATIVE
LEUKOCYTE ESTERASE UR QL STRIP.AUTO: NEGATIVE
LYMPHOCYTES # BLD AUTO: 1.45 10*3/MM3 (ref 1–3)
LYMPHOCYTES NFR BLD AUTO: 15.1 % (ref 21–51)
MCH RBC QN AUTO: 28.4 PG (ref 27–33)
MCH RBC QN AUTO: 28.9 PG (ref 27–33)
MCHC RBC AUTO-ENTMCNC: 32.1 G/DL (ref 33–37)
MCHC RBC AUTO-ENTMCNC: 32.3 G/DL (ref 33–37)
MCV RBC AUTO: 88.5 FL (ref 80–94)
MCV RBC AUTO: 89.4 FL (ref 80–94)
METHADONE UR QL SCN: NEGATIVE
MONOCYTES # BLD AUTO: 0.52 10*3/MM3 (ref 0.1–0.9)
MONOCYTES NFR BLD AUTO: 5.4 % (ref 0–10)
NEUTROPHILS # BLD AUTO: 7.41 10*3/MM3 (ref 1.4–6.5)
NEUTROPHILS NFR BLD AUTO: 77.2 % (ref 30–70)
NITRITE UR QL STRIP: NEGATIVE
NITRITE UR QL STRIP: NEGATIVE
OPIATES UR QL: NEGATIVE
OSMOLALITY SERPL CALC.SUM OF ELEC: 274.6 MOSM/KG (ref 273–305)
OXYCODONE UR QL SCN: NEGATIVE
PCP UR QL SCN: NEGATIVE
PH UR STRIP.AUTO: 6.5 [PH] (ref 5–8)
PH UR STRIP.AUTO: 6.5 [PH] (ref 5–8)
PLATELET # BLD AUTO: 262 10*3/MM3 (ref 130–400)
PLATELET # BLD AUTO: 285 10*3/MM3 (ref 130–400)
PMV BLD AUTO: 9.5 FL (ref 6–10)
PMV BLD AUTO: 9.6 FL (ref 6–10)
POTASSIUM BLD-SCNC: 4.1 MMOL/L (ref 3.5–5.3)
PROT SERPL-MCNC: 8.1 G/DL (ref 6–8)
PROT UR QL STRIP: NEGATIVE
PROT UR QL STRIP: NEGATIVE
RBC # BLD AUTO: 4.43 10*6/MM3 (ref 4.2–5.4)
RBC # BLD AUTO: 4.43 10*6/MM3 (ref 4.2–5.4)
SALICYLATES SERPL-MCNC: <1 MG/DL (ref 0–30)
SODIUM BLD-SCNC: 138 MMOL/L (ref 135–153)
SP GR UR STRIP: <=1.005 (ref 1–1.03)
SP GR UR STRIP: <=1.005 (ref 1–1.03)
UROBILINOGEN UR QL STRIP: NORMAL
UROBILINOGEN UR QL STRIP: NORMAL
WBC NRBC COR # BLD: 9.6 10*3/MM3 (ref 4.5–12.5)
WBC NRBC COR # BLD: 9.69 10*3/MM3 (ref 4.5–12.5)

## 2018-11-09 PROCEDURE — 80307 DRUG TEST PRSMV CHEM ANLYZR: CPT | Performed by: EMERGENCY MEDICINE

## 2018-11-09 PROCEDURE — 80053 COMPREHEN METABOLIC PANEL: CPT | Performed by: EMERGENCY MEDICINE

## 2018-11-09 PROCEDURE — 93010 ELECTROCARDIOGRAM REPORT: CPT | Performed by: INTERNAL MEDICINE

## 2018-11-09 PROCEDURE — G0463 HOSPITAL OUTPT CLINIC VISIT: HCPCS

## 2018-11-09 PROCEDURE — 85025 COMPLETE CBC W/AUTO DIFF WBC: CPT | Performed by: EMERGENCY MEDICINE

## 2018-11-09 PROCEDURE — 99285 EMERGENCY DEPT VISIT HI MDM: CPT

## 2018-11-09 PROCEDURE — 93005 ELECTROCARDIOGRAM TRACING: CPT | Performed by: PSYCHIATRY & NEUROLOGY

## 2018-11-09 PROCEDURE — 81003 URINALYSIS AUTO W/O SCOPE: CPT | Performed by: OBSTETRICS & GYNECOLOGY

## 2018-11-09 PROCEDURE — 85027 COMPLETE CBC AUTOMATED: CPT | Performed by: OBSTETRICS & GYNECOLOGY

## 2018-11-09 PROCEDURE — 81003 URINALYSIS AUTO W/O SCOPE: CPT | Performed by: EMERGENCY MEDICINE

## 2018-11-09 PROCEDURE — 81025 URINE PREGNANCY TEST: CPT | Performed by: OBSTETRICS & GYNECOLOGY

## 2018-11-09 RX ORDER — BENZTROPINE MESYLATE 1 MG/1
1 TABLET ORAL DAILY PRN
Status: DISCONTINUED | OUTPATIENT
Start: 2018-11-09 | End: 2018-11-13 | Stop reason: HOSPADM

## 2018-11-09 RX ORDER — TRAZODONE HYDROCHLORIDE 50 MG/1
50 TABLET ORAL NIGHTLY
Status: DISCONTINUED | OUTPATIENT
Start: 2018-11-09 | End: 2018-11-13 | Stop reason: HOSPADM

## 2018-11-09 RX ORDER — BENZONATATE 100 MG/1
100 CAPSULE ORAL 3 TIMES DAILY PRN
Status: DISCONTINUED | OUTPATIENT
Start: 2018-11-09 | End: 2018-11-13 | Stop reason: HOSPADM

## 2018-11-09 RX ORDER — ECHINACEA PURPUREA EXTRACT 125 MG
2 TABLET ORAL AS NEEDED
Status: DISCONTINUED | OUTPATIENT
Start: 2018-11-09 | End: 2018-11-13 | Stop reason: HOSPADM

## 2018-11-09 RX ORDER — GUANFACINE 1 MG/1
1 TABLET ORAL 2 TIMES DAILY
Status: DISCONTINUED | OUTPATIENT
Start: 2018-11-09 | End: 2018-11-13 | Stop reason: HOSPADM

## 2018-11-09 RX ORDER — THIAMINE HCL 50 MG
100 TABLET ORAL DAILY
Status: DISCONTINUED | OUTPATIENT
Start: 2018-11-09 | End: 2018-11-13 | Stop reason: HOSPADM

## 2018-11-09 RX ORDER — ALUMINA, MAGNESIA, AND SIMETHICONE 2400; 2400; 240 MG/30ML; MG/30ML; MG/30ML
15 SUSPENSION ORAL EVERY 6 HOURS PRN
Status: DISCONTINUED | OUTPATIENT
Start: 2018-11-09 | End: 2018-11-13 | Stop reason: HOSPADM

## 2018-11-09 RX ORDER — BENZTROPINE MESYLATE 1 MG/ML
0.5 INJECTION INTRAMUSCULAR; INTRAVENOUS DAILY PRN
Status: DISCONTINUED | OUTPATIENT
Start: 2018-11-09 | End: 2018-11-13 | Stop reason: HOSPADM

## 2018-11-09 RX ORDER — FAMOTIDINE 20 MG/1
20 TABLET, FILM COATED ORAL 2 TIMES DAILY PRN
Status: DISCONTINUED | OUTPATIENT
Start: 2018-11-09 | End: 2018-11-13 | Stop reason: HOSPADM

## 2018-11-09 RX ORDER — TRAZODONE HYDROCHLORIDE 50 MG/1
50 TABLET ORAL NIGHTLY PRN
Status: DISCONTINUED | OUTPATIENT
Start: 2018-11-09 | End: 2018-11-09 | Stop reason: SDUPTHER

## 2018-11-09 RX ORDER — IBUPROFEN 600 MG/1
600 TABLET ORAL EVERY 6 HOURS PRN
Status: DISCONTINUED | OUTPATIENT
Start: 2018-11-09 | End: 2018-11-13 | Stop reason: HOSPADM

## 2018-11-09 RX ORDER — HYDROXYZINE 50 MG/1
50 TABLET, FILM COATED ORAL EVERY 6 HOURS PRN
Status: DISCONTINUED | OUTPATIENT
Start: 2018-11-09 | End: 2018-11-13 | Stop reason: HOSPADM

## 2018-11-09 RX ORDER — ONDANSETRON 4 MG/1
4 TABLET, FILM COATED ORAL EVERY 6 HOURS PRN
Status: DISCONTINUED | OUTPATIENT
Start: 2018-11-09 | End: 2018-11-13 | Stop reason: HOSPADM

## 2018-11-09 RX ORDER — LOPERAMIDE HYDROCHLORIDE 2 MG/1
2 CAPSULE ORAL 4 TIMES DAILY PRN
Status: DISCONTINUED | OUTPATIENT
Start: 2018-11-09 | End: 2018-11-13 | Stop reason: HOSPADM

## 2018-11-09 RX ORDER — OXCARBAZEPINE 300 MG/1
600 TABLET, FILM COATED ORAL 2 TIMES DAILY
Status: DISCONTINUED | OUTPATIENT
Start: 2018-11-09 | End: 2018-11-13 | Stop reason: HOSPADM

## 2018-11-09 RX ADMIN — TRAZODONE HYDROCHLORIDE 50 MG: 50 TABLET ORAL at 21:03

## 2018-11-09 RX ADMIN — THIAMINE HCL (VITAMIN B1) 50 MG TABLET 100 MG: at 21:03

## 2018-11-09 RX ADMIN — HYDROXYZINE HYDROCHLORIDE 50 MG: 50 TABLET, FILM COATED ORAL at 21:03

## 2018-11-10 PROCEDURE — 99222 1ST HOSP IP/OBS MODERATE 55: CPT | Performed by: PSYCHIATRY & NEUROLOGY

## 2018-11-10 RX ADMIN — GUANFACINE 1 MG: 1 TABLET ORAL at 08:12

## 2018-11-10 RX ADMIN — OXCARBAZEPINE 600 MG: 300 TABLET, FILM COATED ORAL at 08:12

## 2018-11-10 RX ADMIN — HYDROXYZINE HYDROCHLORIDE 50 MG: 50 TABLET, FILM COATED ORAL at 13:43

## 2018-11-10 RX ADMIN — THIAMINE HCL (VITAMIN B1) 50 MG TABLET 100 MG: at 08:12

## 2018-11-10 RX ADMIN — OXCARBAZEPINE 600 MG: 300 TABLET, FILM COATED ORAL at 20:29

## 2018-11-10 RX ADMIN — OXCARBAZEPINE 600 MG: 300 TABLET, FILM COATED ORAL at 00:25

## 2018-11-10 RX ADMIN — TRAZODONE HYDROCHLORIDE 50 MG: 50 TABLET ORAL at 21:59

## 2018-11-10 RX ADMIN — GUANFACINE 1 MG: 1 TABLET ORAL at 20:28

## 2018-11-11 PROCEDURE — 99232 SBSQ HOSP IP/OBS MODERATE 35: CPT | Performed by: PSYCHIATRY & NEUROLOGY

## 2018-11-11 RX ADMIN — GUANFACINE 1 MG: 1 TABLET ORAL at 08:45

## 2018-11-11 RX ADMIN — OXCARBAZEPINE 600 MG: 300 TABLET, FILM COATED ORAL at 20:31

## 2018-11-11 RX ADMIN — OXCARBAZEPINE 600 MG: 300 TABLET, FILM COATED ORAL at 08:45

## 2018-11-11 RX ADMIN — TRAZODONE HYDROCHLORIDE 50 MG: 50 TABLET ORAL at 22:01

## 2018-11-11 RX ADMIN — GUANFACINE 1 MG: 1 TABLET ORAL at 20:29

## 2018-11-11 RX ADMIN — THIAMINE HCL (VITAMIN B1) 50 MG TABLET 100 MG: at 08:45

## 2018-11-12 PROCEDURE — 99232 SBSQ HOSP IP/OBS MODERATE 35: CPT | Performed by: PSYCHIATRY & NEUROLOGY

## 2018-11-12 RX ADMIN — GUANFACINE 1 MG: 1 TABLET ORAL at 08:10

## 2018-11-12 RX ADMIN — OXCARBAZEPINE 600 MG: 300 TABLET, FILM COATED ORAL at 08:10

## 2018-11-12 RX ADMIN — TRAZODONE HYDROCHLORIDE 50 MG: 50 TABLET ORAL at 22:14

## 2018-11-12 RX ADMIN — OXCARBAZEPINE 600 MG: 300 TABLET, FILM COATED ORAL at 20:07

## 2018-11-12 RX ADMIN — GUANFACINE 1 MG: 1 TABLET ORAL at 20:07

## 2018-11-12 RX ADMIN — HYDROXYZINE HYDROCHLORIDE 50 MG: 50 TABLET, FILM COATED ORAL at 17:56

## 2018-11-12 RX ADMIN — THIAMINE HCL (VITAMIN B1) 50 MG TABLET 100 MG: at 08:10

## 2018-11-13 VITALS
OXYGEN SATURATION: 97 % | HEIGHT: 59 IN | DIASTOLIC BLOOD PRESSURE: 70 MMHG | TEMPERATURE: 97.5 F | HEART RATE: 85 BPM | WEIGHT: 213.8 LBS | RESPIRATION RATE: 18 BRPM | SYSTOLIC BLOOD PRESSURE: 135 MMHG | BODY MASS INDEX: 43.1 KG/M2

## 2018-11-13 PROCEDURE — 99239 HOSP IP/OBS DSCHRG MGMT >30: CPT | Performed by: PSYCHIATRY & NEUROLOGY

## 2018-11-13 RX ADMIN — OXCARBAZEPINE 600 MG: 300 TABLET, FILM COATED ORAL at 08:07

## 2018-11-13 RX ADMIN — GUANFACINE 1 MG: 1 TABLET ORAL at 08:07

## 2021-02-22 ENCOUNTER — HOSPITAL ENCOUNTER (EMERGENCY)
Facility: HOSPITAL | Age: 21
Discharge: HOME | End: 2021-02-22
Attending: FAMILY MEDICINE | Admitting: FAMILY MEDICINE

## 2021-02-22 VITALS
RESPIRATION RATE: 18 BRPM | HEIGHT: 60 IN | WEIGHT: 213 LBS | SYSTOLIC BLOOD PRESSURE: 171 MMHG | HEART RATE: 78 BPM | BODY MASS INDEX: 41.82 KG/M2 | TEMPERATURE: 98.3 F | DIASTOLIC BLOOD PRESSURE: 99 MMHG | OXYGEN SATURATION: 99 %

## 2021-02-22 DIAGNOSIS — Z32.02 PREGNANCY EXAMINATION OR TEST, NEGATIVE RESULT: Primary | ICD-10-CM

## 2021-02-22 LAB — B-HCG UR QL: NEGATIVE

## 2021-02-22 PROCEDURE — 99283 EMERGENCY DEPT VISIT LOW MDM: CPT

## 2021-02-22 PROCEDURE — 81025 URINE PREGNANCY TEST: CPT | Performed by: PHYSICIAN ASSISTANT

## 2021-02-22 NOTE — ED PROVIDER NOTES
Subjective   20-year-old female presents to the emergency room for pregnancy test.  Patient states she is having no issues at this time and would only like a pregnancy test.  Denies any other complaints or concerns at this time.      History provided by:  Patient   used: No        Review of Systems   Constitutional: Negative.  Negative for fever.   HENT: Negative.    Respiratory: Negative.    Cardiovascular: Negative.  Negative for chest pain.   Gastrointestinal: Negative.  Negative for abdominal pain.   Endocrine: Negative.    Genitourinary: Negative.  Negative for dysuria.   Skin: Negative.    Neurological: Negative.    Psychiatric/Behavioral: Negative.    All other systems reviewed and are negative.      Past Medical History:   Diagnosis Date   • Anxiety    • Depression    • Intellectual disability    • Suicidal thoughts        No Known Allergies    Past Surgical History:   Procedure Laterality Date   • EYE SURGERY Bilateral     childhood       Family History   Problem Relation Age of Onset   • Hypertension Maternal Grandmother        Social History     Socioeconomic History   • Marital status: Single     Spouse name: Not on file   • Number of children: Not on file   • Years of education: Not on file   • Highest education level: Not on file   Tobacco Use   • Smoking status: Never Smoker   • Smokeless tobacco: Never Used   Substance and Sexual Activity   • Alcohol use: No   • Drug use: No   • Sexual activity: Yes     Partners: Male     Birth control/protection: Injection           Objective   Physical Exam  Vitals signs and nursing note reviewed.   Constitutional:       General: She is not in acute distress.     Appearance: She is well-developed. She is not diaphoretic.   HENT:      Head: Normocephalic and atraumatic.      Right Ear: External ear normal.      Left Ear: External ear normal.      Nose: Nose normal.   Eyes:      Conjunctiva/sclera: Conjunctivae normal.      Pupils: Pupils are  equal, round, and reactive to light.   Neck:      Musculoskeletal: Normal range of motion and neck supple.      Vascular: No JVD.      Trachea: No tracheal deviation.   Cardiovascular:      Rate and Rhythm: Normal rate and regular rhythm.      Heart sounds: Normal heart sounds. No murmur.   Pulmonary:      Effort: Pulmonary effort is normal. No respiratory distress.      Breath sounds: Normal breath sounds. No wheezing.   Abdominal:      General: Bowel sounds are normal.      Palpations: Abdomen is soft.      Tenderness: There is no abdominal tenderness.   Musculoskeletal: Normal range of motion.         General: No deformity.   Skin:     General: Skin is warm and dry.      Coloration: Skin is not pale.      Findings: No erythema or rash.   Neurological:      Mental Status: She is alert and oriented to person, place, and time.      Cranial Nerves: No cranial nerve deficit.   Psychiatric:         Behavior: Behavior normal.         Thought Content: Thought content normal.         Procedures           ED Course  ED Course as of Feb 22 1832   Mon Feb 22, 2021   1650 HCG, Urine QL: Negative [TK]      ED Course User Index  [TK] Di Gerenberg PA-C                                           MDM  Number of Diagnoses or Management Options  Pregnancy examination or test, negative result: new and requires workup     Amount and/or Complexity of Data Reviewed  Clinical lab tests: reviewed and ordered    Risk of Complications, Morbidity, and/or Mortality  Presenting problems: low  Diagnostic procedures: low  Management options: minimal    Patient Progress  Patient progress: stable      Final diagnoses:   Pregnancy examination or test, negative result            Di Greenberg PA-C  02/22/21 1832

## 2021-03-15 ENCOUNTER — BULK ORDERING (OUTPATIENT)
Dept: CASE MANAGEMENT | Facility: OTHER | Age: 21
End: 2021-03-15

## 2021-03-15 DIAGNOSIS — Z23 IMMUNIZATION DUE: ICD-10-CM

## 2022-03-07 ENCOUNTER — HOSPITAL ENCOUNTER (OUTPATIENT)
Dept: ULTRASOUND IMAGING | Facility: HOSPITAL | Age: 22
Discharge: HOME | End: 2022-03-07
Admitting: NURSE PRACTITIONER

## 2022-03-07 DIAGNOSIS — R11.12 PROJECTILE VOMITING: ICD-10-CM

## 2022-03-07 DIAGNOSIS — R10.11 RUQ ABDOMINAL PAIN: ICD-10-CM

## 2022-03-07 PROCEDURE — 76705 ECHO EXAM OF ABDOMEN: CPT | Performed by: RADIOLOGY

## 2022-03-07 PROCEDURE — 76705 ECHO EXAM OF ABDOMEN: CPT

## 2024-11-20 ENCOUNTER — HOSPITAL ENCOUNTER (EMERGENCY)
Facility: HOSPITAL | Age: 24
Discharge: STILL ADMITTED OR ADMINISTRATIVE | DRG: 886 | End: 2024-11-21
Attending: EMERGENCY MEDICINE
Payer: MEDICAID

## 2024-11-20 DIAGNOSIS — R46.89 BEHAVIORAL PROBLEM: Primary | ICD-10-CM

## 2024-11-20 LAB
ALBUMIN SERPL-MCNC: 3.9 G/DL (ref 3.5–5.2)
ALBUMIN/GLOB SERPL: 0.8 G/DL
ALP SERPL-CCNC: 91 U/L (ref 39–117)
ALT SERPL W P-5'-P-CCNC: 19 U/L (ref 1–33)
AMPHET+METHAMPHET UR QL: NEGATIVE
AMPHETAMINES UR QL: NEGATIVE
ANION GAP SERPL CALCULATED.3IONS-SCNC: 12.9 MMOL/L (ref 5–15)
AST SERPL-CCNC: 24 U/L (ref 1–32)
BARBITURATES UR QL SCN: NEGATIVE
BASOPHILS # BLD AUTO: 0.03 10*3/MM3 (ref 0–0.2)
BASOPHILS NFR BLD AUTO: 0.3 % (ref 0–1.5)
BENZODIAZ UR QL SCN: NEGATIVE
BILIRUB SERPL-MCNC: 0.6 MG/DL (ref 0–1.2)
BILIRUB UR QL STRIP: NEGATIVE
BUN SERPL-MCNC: 12 MG/DL (ref 6–20)
BUN/CREAT SERPL: 12.8 (ref 7–25)
BUPRENORPHINE SERPL-MCNC: NEGATIVE NG/ML
CALCIUM SPEC-SCNC: 9.4 MG/DL (ref 8.6–10.5)
CANNABINOIDS SERPL QL: NEGATIVE
CHLORIDE SERPL-SCNC: 105 MMOL/L (ref 98–107)
CLARITY UR: CLEAR
CO2 SERPL-SCNC: 20.1 MMOL/L (ref 22–29)
COCAINE UR QL: NEGATIVE
COLOR UR: YELLOW
CREAT SERPL-MCNC: 0.94 MG/DL (ref 0.57–1)
DEPRECATED RDW RBC AUTO: 44.6 FL (ref 37–54)
EGFRCR SERPLBLD CKD-EPI 2021: 86.5 ML/MIN/1.73
EOSINOPHIL # BLD AUTO: 0.09 10*3/MM3 (ref 0–0.4)
EOSINOPHIL NFR BLD AUTO: 1 % (ref 0.3–6.2)
ERYTHROCYTE [DISTWIDTH] IN BLOOD BY AUTOMATED COUNT: 13.4 % (ref 12.3–15.4)
ETHANOL BLD-MCNC: <10 MG/DL (ref 0–10)
ETHANOL UR QL: <0.01 %
FENTANYL UR-MCNC: NEGATIVE NG/ML
GLOBULIN UR ELPH-MCNC: 4.7 GM/DL
GLUCOSE SERPL-MCNC: 102 MG/DL (ref 65–99)
GLUCOSE UR STRIP-MCNC: NEGATIVE MG/DL
HCG SERPL QL: NEGATIVE
HCT VFR BLD AUTO: 42.6 % (ref 34–46.6)
HGB BLD-MCNC: 13.1 G/DL (ref 12–15.9)
HGB UR QL STRIP.AUTO: NEGATIVE
IMM GRANULOCYTES # BLD AUTO: 0.05 10*3/MM3 (ref 0–0.05)
IMM GRANULOCYTES NFR BLD AUTO: 0.5 % (ref 0–0.5)
KETONES UR QL STRIP: ABNORMAL
LEUKOCYTE ESTERASE UR QL STRIP.AUTO: NEGATIVE
LYMPHOCYTES # BLD AUTO: 1.29 10*3/MM3 (ref 0.7–3.1)
LYMPHOCYTES NFR BLD AUTO: 13.8 % (ref 19.6–45.3)
MAGNESIUM SERPL-MCNC: 2.1 MG/DL (ref 1.6–2.6)
MCH RBC QN AUTO: 27.7 PG (ref 26.6–33)
MCHC RBC AUTO-ENTMCNC: 30.8 G/DL (ref 31.5–35.7)
MCV RBC AUTO: 90.1 FL (ref 79–97)
METHADONE UR QL SCN: NEGATIVE
MONOCYTES # BLD AUTO: 0.38 10*3/MM3 (ref 0.1–0.9)
MONOCYTES NFR BLD AUTO: 4.1 % (ref 5–12)
NEUTROPHILS NFR BLD AUTO: 7.53 10*3/MM3 (ref 1.7–7)
NEUTROPHILS NFR BLD AUTO: 80.3 % (ref 42.7–76)
NITRITE UR QL STRIP: NEGATIVE
NRBC BLD AUTO-RTO: 0 /100 WBC (ref 0–0.2)
OPIATES UR QL: NEGATIVE
OXYCODONE UR QL SCN: NEGATIVE
PCP UR QL SCN: NEGATIVE
PH UR STRIP.AUTO: 5.5 [PH] (ref 5–8)
PLATELET # BLD AUTO: 207 10*3/MM3 (ref 140–450)
PMV BLD AUTO: 10.5 FL (ref 6–12)
POTASSIUM SERPL-SCNC: 4.1 MMOL/L (ref 3.5–5.2)
PROT SERPL-MCNC: 8.6 G/DL (ref 6–8.5)
PROT UR QL STRIP: NEGATIVE
RBC # BLD AUTO: 4.73 10*6/MM3 (ref 3.77–5.28)
SODIUM SERPL-SCNC: 138 MMOL/L (ref 136–145)
SP GR UR STRIP: 1.02 (ref 1–1.03)
TRICYCLICS UR QL SCN: NEGATIVE
UROBILINOGEN UR QL STRIP: ABNORMAL
WBC NRBC COR # BLD AUTO: 9.37 10*3/MM3 (ref 3.4–10.8)

## 2024-11-20 PROCEDURE — 84703 CHORIONIC GONADOTROPIN ASSAY: CPT | Performed by: EMERGENCY MEDICINE

## 2024-11-20 PROCEDURE — 36415 COLL VENOUS BLD VENIPUNCTURE: CPT

## 2024-11-20 PROCEDURE — 99285 EMERGENCY DEPT VISIT HI MDM: CPT

## 2024-11-20 PROCEDURE — 80307 DRUG TEST PRSMV CHEM ANLYZR: CPT | Performed by: EMERGENCY MEDICINE

## 2024-11-20 PROCEDURE — 83735 ASSAY OF MAGNESIUM: CPT | Performed by: EMERGENCY MEDICINE

## 2024-11-20 PROCEDURE — 81003 URINALYSIS AUTO W/O SCOPE: CPT | Performed by: EMERGENCY MEDICINE

## 2024-11-20 PROCEDURE — 80053 COMPREHEN METABOLIC PANEL: CPT | Performed by: EMERGENCY MEDICINE

## 2024-11-20 PROCEDURE — 85025 COMPLETE CBC W/AUTO DIFF WBC: CPT | Performed by: EMERGENCY MEDICINE

## 2024-11-20 PROCEDURE — 82077 ASSAY SPEC XCP UR&BREATH IA: CPT | Performed by: EMERGENCY MEDICINE

## 2024-11-21 ENCOUNTER — HOSPITAL ENCOUNTER (INPATIENT)
Facility: HOSPITAL | Age: 24
LOS: 1 days | Discharge: HOME | End: 2024-11-22
Attending: PSYCHIATRY & NEUROLOGY | Admitting: PSYCHIATRY & NEUROLOGY
Payer: MEDICAID

## 2024-11-21 VITALS
OXYGEN SATURATION: 97 % | HEART RATE: 84 BPM | WEIGHT: 293 LBS | BODY MASS INDEX: 59.07 KG/M2 | TEMPERATURE: 98.2 F | DIASTOLIC BLOOD PRESSURE: 98 MMHG | RESPIRATION RATE: 18 BRPM | SYSTOLIC BLOOD PRESSURE: 139 MMHG | HEIGHT: 59 IN

## 2024-11-21 LAB
HAV IGM SERPL QL IA: NORMAL
HBV CORE IGM SERPL QL IA: NORMAL
HBV SURFACE AG SERPL QL IA: NORMAL
HCV AB SER QL: NORMAL
QT INTERVAL: 364 MS
QTC INTERVAL: 478 MS

## 2024-11-21 PROCEDURE — 99222 1ST HOSP IP/OBS MODERATE 55: CPT | Performed by: PSYCHIATRY & NEUROLOGY

## 2024-11-21 PROCEDURE — 93010 ELECTROCARDIOGRAM REPORT: CPT | Performed by: INTERNAL MEDICINE

## 2024-11-21 PROCEDURE — 93005 ELECTROCARDIOGRAM TRACING: CPT | Performed by: PSYCHIATRY & NEUROLOGY

## 2024-11-21 PROCEDURE — 80074 ACUTE HEPATITIS PANEL: CPT | Performed by: PSYCHIATRY & NEUROLOGY

## 2024-11-21 RX ORDER — HYDROXYZINE HYDROCHLORIDE 50 MG/1
50 TABLET, FILM COATED ORAL EVERY 6 HOURS PRN
Status: DISCONTINUED | OUTPATIENT
Start: 2024-11-21 | End: 2024-11-22 | Stop reason: HOSPADM

## 2024-11-21 RX ORDER — LOPERAMIDE HYDROCHLORIDE 2 MG/1
2 CAPSULE ORAL
Status: DISCONTINUED | OUTPATIENT
Start: 2024-11-21 | End: 2024-11-22 | Stop reason: HOSPADM

## 2024-11-21 RX ORDER — CYANOCOBALAMIN 1000 UG/ML
1000 INJECTION, SOLUTION INTRAMUSCULAR; SUBCUTANEOUS
COMMUNITY

## 2024-11-21 RX ORDER — POLYETHYLENE GLYCOL 3350 17 G/17G
17 POWDER, FOR SOLUTION ORAL DAILY PRN
Status: DISCONTINUED | OUTPATIENT
Start: 2024-11-21 | End: 2024-11-22 | Stop reason: HOSPADM

## 2024-11-21 RX ORDER — ONDANSETRON 4 MG/1
4 TABLET, ORALLY DISINTEGRATING ORAL EVERY 6 HOURS PRN
Status: DISCONTINUED | OUTPATIENT
Start: 2024-11-21 | End: 2024-11-22 | Stop reason: HOSPADM

## 2024-11-21 RX ORDER — ECHINACEA PURPUREA EXTRACT 125 MG
2 TABLET ORAL AS NEEDED
Status: DISCONTINUED | OUTPATIENT
Start: 2024-11-21 | End: 2024-11-22 | Stop reason: HOSPADM

## 2024-11-21 RX ORDER — ACETAMINOPHEN 325 MG/1
650 TABLET ORAL EVERY 6 HOURS PRN
Status: DISCONTINUED | OUTPATIENT
Start: 2024-11-21 | End: 2024-11-22 | Stop reason: HOSPADM

## 2024-11-21 RX ORDER — ALUMINA, MAGNESIA, AND SIMETHICONE 2400; 2400; 240 MG/30ML; MG/30ML; MG/30ML
15 SUSPENSION ORAL EVERY 6 HOURS PRN
Status: DISCONTINUED | OUTPATIENT
Start: 2024-11-21 | End: 2024-11-22 | Stop reason: HOSPADM

## 2024-11-21 RX ORDER — TRAZODONE HYDROCHLORIDE 50 MG/1
50 TABLET, FILM COATED ORAL NIGHTLY PRN
Status: DISCONTINUED | OUTPATIENT
Start: 2024-11-21 | End: 2024-11-22 | Stop reason: HOSPADM

## 2024-11-21 RX ORDER — FAMOTIDINE 20 MG/1
20 TABLET, FILM COATED ORAL 2 TIMES DAILY PRN
Status: DISCONTINUED | OUTPATIENT
Start: 2024-11-21 | End: 2024-11-22 | Stop reason: HOSPADM

## 2024-11-21 RX ORDER — IBUPROFEN 400 MG/1
400 TABLET, FILM COATED ORAL EVERY 6 HOURS PRN
Status: DISCONTINUED | OUTPATIENT
Start: 2024-11-21 | End: 2024-11-22 | Stop reason: HOSPADM

## 2024-11-21 RX ORDER — BENZTROPINE MESYLATE 1 MG/1
2 TABLET ORAL ONCE AS NEEDED
Status: DISCONTINUED | OUTPATIENT
Start: 2024-11-21 | End: 2024-11-22 | Stop reason: HOSPADM

## 2024-11-21 RX ORDER — CYANOCOBALAMIN 1000 UG/ML
1000 INJECTION, SOLUTION INTRAMUSCULAR; SUBCUTANEOUS
Status: CANCELLED | OUTPATIENT
Start: 2024-11-21

## 2024-11-21 RX ORDER — BENZONATATE 100 MG/1
100 CAPSULE ORAL 3 TIMES DAILY PRN
Status: DISCONTINUED | OUTPATIENT
Start: 2024-11-21 | End: 2024-11-22 | Stop reason: HOSPADM

## 2024-11-21 RX ORDER — BENZTROPINE MESYLATE 1 MG/ML
1 INJECTION, SOLUTION INTRAMUSCULAR; INTRAVENOUS ONCE AS NEEDED
Status: DISCONTINUED | OUTPATIENT
Start: 2024-11-21 | End: 2024-11-22 | Stop reason: HOSPADM

## 2024-11-21 RX ADMIN — TRAZODONE HYDROCHLORIDE 50 MG: 50 TABLET ORAL at 22:09

## 2024-11-21 NOTE — PLAN OF CARE
Problem: Adult Behavioral Health Plan of Care  Goal: Plan of Care Review  Outcome: Progressing  Flowsheets  Taken 11/21/2024 1146 by Gayle Harrington LCSW  Progress: improving  Patient Agreement with Plan of Care: agrees  Outcome Evaluation: Reviewed plan of care and completed adult social history.  Plan of Care Reviewed With:   patient   guardian  Taken 11/21/2024 0145 by Prosper Strange RN  Consent Given to Review Plan with: Legal Guardian Queta Khan  Goal: Patient-Specific Goal (Individualization)  Outcome: Progressing  Flowsheets  Taken 11/21/2024 1146  Patient/Family-Specific Goals (Include Timeframe): Rosa to deny suicidal ideation prior to discharge. Rosa will attend group therapy over the next 48 hours to discuss information learned to assist with development of healthy coping.  Patient to return home upon stabilization with a long-term goal of maintaining in the home.  Individualized Care Needs: Medication management, individual and group therapy  Anxieties, Fears or Concerns: anxious to get back home  Taken 11/21/2024 1124  Patient Personal Strengths:   expressive of emotions   expressive of needs   family/social support   stable living environment   spiritual/M[employer]on support  Patient Vulnerabilities:   lacks insight into illness   limited ability to read/write   poor impulse control   family/relationship conflict  Goal: Optimized Coping Skills in Response to Life Stressors  Outcome: Progressing  Intervention: Promote Effective Coping Strategies  Flowsheets (Taken 11/21/2024 1146)  Supportive Measures:   active listening utilized   self-reflection promoted   counseling provided   positive reinforcement provided   self-responsibility promoted   decision-making supported   goal-setting facilitated   problem-solving facilitated   verbalization of feelings encouraged   self-care encouraged  Goal: Develops/Participates in Therapeutic [place] to Support Successful Transition  Outcome:  Progressing  Intervention: Foster Therapeutic [place]  Flowsheets (Taken 11/21/2024 1146)  Trust Relationship/Rapport:   care explained   reassurance provided   choices provided   thoughts/feelings acknowledged   emotional support provided   empathic listening provided   questions answered   questions encouraged  Intervention: Mutually Develop Transition Plan  Flowsheets  Taken 11/21/2024 1146  Transition Support:   community resources reviewed   follow-up care discussed   crisis management plan promoted   crisis management plan verbalized  Taken 11/21/2024 1122  Discharge Coordination/Progress: Patient has [place] Medicaid, family for transport and patients guardian consents to [employer].  Transportation Anticipated: family or friend will provide  Transportation Concerns: no car  Current Discharge Risk:   psychiatric illness   cognitively impaired  Concerns to be Addressed:   coping/stress   cognitive/perceptual   mental health   relationship  Readmission Within the Last 30 Days: no previous admission in last 30 days  Patient/Family Anticipated Services at Transition:   outpatient care   mental health services  Patient's Choice of Community Agency(s): Guardian consents to [employer]  Patient/Family Anticipates Transition to: home with family  Offered/Gave Vendor List: no   Goal Outcome Evaluation:  Plan of Care Reviewed With: patient, guardian  Patient Agreement with Plan of Care: agrees     Progress: improving  Outcome Evaluation: Reviewed plan of care and completed adult social history.     DATA:         Therapist met individually with patient this date to introduce role and to discuss hospitalization expectations. Patient agreeable. Therapist contacted patients mother along with patient, obtained collateral, discussed safety and aftercare.     Clinical Maneuvering/Intervention:     Therapist assisted patient in processing session content; acknowledged and normalized patient’s thoughts, feelings, and  concerns.  Discussed the therapist/patient relationship and explain the parameters and limitations of relative confidentiality.  Also discussed the importance of active participation, and honesty to the treatment process.  Encouraged the patient to discuss/vent their feelings, frustrations, and fears concerning their ongoing medical issues and validated their feelings.     Discussed the importance of finding enjoyable activities and coping skills that the patient can engage in a regular basis. Discussed healthy coping skills such as distraction, self love, grounding, thought challenges/reframing, etc.  Provided patient with list of healthy coping skills this date. Discussed the importance of medication compliance.  Praised the patient for seeking help and spent the majority of the session building rapport.       Allowed patient to freely discuss issues without interruption or judgment. Provided safe, confidential environment to facilitate the development of positive therapeutic relationship and encourage open, honest communication.      Therapist addressed discharge safety planning this date. Assisted patient in identifying risk factors which would indicate the need for higher level of care after discharge;  including thoughts to harm self or others and/or self-harming behavior. Encouraged patient to call 911, or present to the nearest emergency room should any of these events occur. Discussed crisis intervention services and means to access.  Encouraged securing any objects of harm.       Therapist completed integrated summary, treatment plan, and initiated social history this date.  Therapist to continue contact with guardian as needed.     ASSESSMENT:      Patient is a 23-year-old female who resides with her guardian/her mother in [facility]. Patient has a history of multiple admissions last being in 2018. Patient is high school educated-special education and is disabled. Patients mother reported that patient had  been on the phone with a male who ended up showing up to the home. Patients mother reports that the male was from [place], had another male and 3 females with him. He was apparently trying to get patients disability check. Patients mother reports there were several messages on patients' phone with this individual and the phone was handed over to the police. Patients mother reports that she has an appointment with the social security office in the morning at 9:30 am then can come pick patient up. She reports that patient's worker will also be coming to the home to see patient on Monday. Patients mother consents to [facility] for aftercare.      PLAN:       Patient to remain hospitalized this date.     Treatment team will focus efforts on stabilizing patient's acute symptoms while providing education on healthy coping and crisis management to reduce hospitalizations.   Patient requires daily psychiatrist evaluation and 24/7 nursing supervision to promote patient  safety.     Therapist will offer 1-4 individual sessions, 1 therapy group daily, family education, and appropriate referral.    Patient will return home with her mother.  Aftercare with [facility]-mother consents.  Mother can transport tomorrow after meeting at the social security office.

## 2024-11-21 NOTE — PLAN OF CARE
Goal Outcome Evaluation:  Plan of Care Reviewed With: patient  Plan of Care Reviewed With: patient  Patient Agreement with Plan of Care: agrees  Consent Given to Review Plan with: Legal Guardian Queta Khan     Outcome Evaluation: New admission to unit. Care plan initiated.

## 2024-11-21 NOTE — NURSING NOTE
Spoke with Dr. Linares about plan of care for patient and he advised to admit patient on routine orders. I have relayed this recommendation to the ED provider, patient and legal guardian as well.

## 2024-11-21 NOTE — ED PROVIDER NOTES
Subjective   History of Present Illness  Patient is a 23-year-old female with significant past medical history positive for anxiety, depression, intellectual disability, suicidal ideation in the past presenting to the ER for psychiatric evaluation. Patient presents after dispute with her mother. Mother states that patient was pinching biting and twisting her arms when patients sister called 911 for help. Patient then stated if she didn't get some help she was going to hurt herself. While assessing patient made the same statement that she needed help or she was going to do something to harm herself.           History provided by:  Patient and parent  History limited by:  Psychiatric disorder   used: No        Review of Systems   Constitutional: Negative.  Negative for fever.   HENT: Negative.     Respiratory: Negative.     Cardiovascular: Negative.  Negative for chest pain.   Gastrointestinal: Negative.  Negative for abdominal pain.   Endocrine: Negative.    Genitourinary: Negative.  Negative for dysuria.   Skin: Negative.    Neurological: Negative.    Psychiatric/Behavioral:  Positive for behavioral problems.    All other systems reviewed and are negative.      Past Medical History:   Diagnosis Date    Anxiety     Depression     Intellectual disability     Suicidal thoughts        No Known Allergies    Past Surgical History:   Procedure Laterality Date    EYE SURGERY Bilateral     childhood       Family History   Problem Relation Age of Onset    Hypertension Maternal Grandmother        Social History     Socioeconomic History    Marital status: Single    Years of education: 12    Highest education level: High school graduate   Tobacco Use    Smoking status: Never    Smokeless tobacco: Never   Vaping Use    Vaping status: Every Day    Substances: Nicotine, Flavoring    Devices: Disposable   Substance and Sexual Activity    Alcohol use: No    Drug use: No    Sexual activity: Yes     Partners: Male      Birth control/protection: Injection           Objective   Physical Exam  Vitals and nursing note reviewed.   Constitutional:       General: She is not in acute distress.     Appearance: She is well-developed. She is not diaphoretic.   HENT:      Head: Normocephalic and atraumatic.      Right Ear: External ear normal.      Left Ear: External ear normal.      Nose: Nose normal.   Eyes:      Conjunctiva/sclera: Conjunctivae normal.      Pupils: Pupils are equal, round, and reactive to light.   Neck:      Vascular: No JVD.      Trachea: No tracheal deviation.   Cardiovascular:      Rate and Rhythm: Normal rate and regular rhythm.      Heart sounds: Normal heart sounds. No murmur heard.  Pulmonary:      Effort: Pulmonary effort is normal. No respiratory distress.      Breath sounds: Normal breath sounds. No wheezing.   Abdominal:      General: Bowel sounds are normal.      Palpations: Abdomen is soft.      Tenderness: There is no abdominal tenderness.   Musculoskeletal:         General: No deformity. Normal range of motion.      Cervical back: Normal range of motion and neck supple.   Skin:     General: Skin is warm and dry.      Coloration: Skin is not pale.      Findings: No erythema or rash.   Neurological:      Mental Status: She is alert and oriented to person, place, and time.      Cranial Nerves: No cranial nerve deficit.   Psychiatric:         Attention and Perception: She is inattentive.         Mood and Affect: Mood is anxious.         Behavior: Behavior is agitated.         Thought Content: Thought content includes suicidal ideation. Thought content does not include suicidal plan.         Procedures       Results for orders placed or performed during the hospital encounter of 11/20/24   Urinalysis With Microscopic If Indicated (No Culture) - Urine, Clean Catch    Collection Time: 11/20/24 10:48 PM    Specimen: Urine, Clean Catch   Result Value Ref Range    Color, UA Yellow Yellow, Straw    Appearance, UA  Clear Clear    pH, UA 5.5 5.0 - 8.0    Specific Gravity, UA 1.021 1.005 - 1.030    Glucose, UA Negative Negative    Ketones, UA Trace (A) Negative    Bilirubin, UA Negative Negative    Blood, UA Negative Negative    Protein, UA Negative Negative    Leuk Esterase, UA Negative Negative    Nitrite, UA Negative Negative    Urobilinogen, UA 1.0 E.U./dL 0.2 - 1.0 E.U./dL   Urine Drug Screen - Urine, Clean Catch    Collection Time: 11/20/24 10:48 PM    Specimen: Urine, Clean Catch   Result Value Ref Range    THC, Screen, Urine Negative Negative    Phencyclidine (PCP), Urine Negative Negative    Cocaine Screen, Urine Negative Negative    Methamphetamine, Ur Negative Negative    Opiate Screen Negative Negative    Amphetamine Screen, Urine Negative Negative    Benzodiazepine Screen, Urine Negative Negative    Tricyclic Antidepressants Screen Negative Negative    Methadone Screen, Urine Negative Negative    Barbiturates Screen, Urine Negative Negative    Oxycodone Screen, Urine Negative Negative    Buprenorphine, Screen, Urine Negative Negative   Fentanyl, Urine - Urine, Clean Catch    Collection Time: 11/20/24 10:48 PM    Specimen: Urine, Clean Catch   Result Value Ref Range    Fentanyl, Urine Negative Negative   hCG, Serum, Qualitative    Collection Time: 11/20/24 11:04 PM    Specimen: Blood   Result Value Ref Range    HCG Qualitative Negative Negative   Comprehensive Metabolic Panel    Collection Time: 11/20/24 11:04 PM    Specimen: Blood   Result Value Ref Range    Glucose 102 (H) 65 - 99 mg/dL    BUN 12 6 - 20 mg/dL    Creatinine 0.94 0.57 - 1.00 mg/dL    Sodium 138 136 - 145 mmol/L    Potassium 4.1 3.5 - 5.2 mmol/L    Chloride 105 98 - 107 mmol/L    CO2 20.1 (L) 22.0 - 29.0 mmol/L    Calcium 9.4 8.6 - 10.5 mg/dL    Total Protein 8.6 (H) 6.0 - 8.5 g/dL    Albumin 3.9 3.5 - 5.2 g/dL    ALT (SGPT) 19 1 - 33 U/L    AST (SGOT) 24 1 - 32 U/L    Alkaline Phosphatase 91 39 - 117 U/L    Total Bilirubin 0.6 0.0 - 1.2 mg/dL     Globulin 4.7 gm/dL    A/G Ratio 0.8 g/dL    BUN/Creatinine Ratio 12.8 7.0 - 25.0    Anion Gap 12.9 5.0 - 15.0 mmol/L    eGFR 86.5 >60.0 mL/min/1.73   Magnesium    Collection Time: 11/20/24 11:04 PM    Specimen: Blood   Result Value Ref Range    Magnesium 2.1 1.6 - 2.6 mg/dL   Ethanol    Collection Time: 11/20/24 11:04 PM    Specimen: Blood   Result Value Ref Range    Ethanol <10 0 - 10 mg/dL    Ethanol % <0.010 %   CBC Auto Differential    Collection Time: 11/20/24 11:04 PM    Specimen: Blood   Result Value Ref Range    WBC 9.37 3.40 - 10.80 10*3/mm3    RBC 4.73 3.77 - 5.28 10*6/mm3    Hemoglobin 13.1 12.0 - 15.9 g/dL    Hematocrit 42.6 34.0 - 46.6 %    MCV 90.1 79.0 - 97.0 fL    MCH 27.7 26.6 - 33.0 pg    MCHC 30.8 (L) 31.5 - 35.7 g/dL    RDW 13.4 12.3 - 15.4 %    RDW-SD 44.6 37.0 - 54.0 fl    MPV 10.5 6.0 - 12.0 fL    Platelets 207 140 - 450 10*3/mm3    Neutrophil % 80.3 (H) 42.7 - 76.0 %    Lymphocyte % 13.8 (L) 19.6 - 45.3 %    Monocyte % 4.1 (L) 5.0 - 12.0 %    Eosinophil % 1.0 0.3 - 6.2 %    Basophil % 0.3 0.0 - 1.5 %    Immature Grans % 0.5 0.0 - 0.5 %    Neutrophils, Absolute 7.53 (H) 1.70 - 7.00 10*3/mm3    Lymphocytes, Absolute 1.29 0.70 - 3.10 10*3/mm3    Monocytes, Absolute 0.38 0.10 - 0.90 10*3/mm3    Eosinophils, Absolute 0.09 0.00 - 0.40 10*3/mm3    Basophils, Absolute 0.03 0.00 - 0.20 10*3/mm3    Immature Grans, Absolute 0.05 0.00 - 0.05 10*3/mm3    nRBC 0.0 0.0 - 0.2 /100 WBC        ED Course                                                       Medical Decision Making  Patient is a 23-year-old female with significant past medical history positive for anxiety, depression, intellectual disability, suicidal ideation in the past presenting to the ER for psychiatric evaluation. Patient presents after dispute with her mother. Mother states that patient was pinching biting and twisting her arms when patients sister called 911 for help. Patient then stated if she didn't get some help she was going to hurt  herself. While assessing patient made the same statement that she needed help or she was going to do something to harm herself.     Patient to be admitted for further evaluation and treatment    Amount and/or Complexity of Data Reviewed  Labs: ordered.        Final diagnoses:   Behavioral problem       ED Disposition  ED Disposition       ED Disposition   DC/Transfer to Behavioral Health    [facility]   --    Comment   --               No follow-up provider specified.       Medication List      No changes were made to your prescriptions during this visit.            Maddi Smith, APRN  11/21/24 0424

## 2024-11-21 NOTE — H&P
INITIAL PSYCHIATRIC HISTORY & PHYSICAL    Patient Identification:  Name:  Rosa Castro  Age:  23 y.o.  Sex:  female  :  2000  MRN:  [id number]   Visit Number:  [id number]  Primary Care Physician:  Iris Eric APRN    SUBJECTIVE    CC/Focus of Exam: agitation and threats of self harm    HPI: Rosa Castro is a 23 y.o. female who was admitted on 2024 with complaints of worsening agitation and threats of self harm. She reports she got into an argument with her mother over her boyfriend and the argument escalated. She told her mother to let her leave and go to a friend's house and mother refused to let her do that. At that point patient threatened to harm herself if she didn't get to leave and her mother called the law and the patient was then brought to the hospital. The patient reports she is feeling better now and realizes that she was acting inappropriately and she should not have made those threats. She has not talked to her mother but is willing to apologize her and work with her to avoid something like this from happening again.     PAST PSYCHIATRIC HX: The patient has a history of impulse control disorder and intellectual disability. She has been admitted to the inpatient psych units 7 times in the past.     SUBSTANCE USE HX: None    SOCIAL HX:   Social History     Socioeconomic History    Marital status: Single    Years of education: 12    Highest education level: High school graduate   Tobacco Use    Smoking status: Never    Smokeless tobacco: Never   Vaping Use    Vaping status: Every Day    Substances: Nicotine, Flavoring    Devices: Disposable   Substance and Sexual Activity    Alcohol use: No    Drug use: No    Sexual activity: Yes     Partners: Male     Birth control/protection: Injection         Past Medical History:   Diagnosis Date    Anxiety     Depression     Intellectual disability     Suicidal thoughts           Past Surgical History:   Procedure Laterality Date     EYE SURGERY Bilateral     childhood       Family History   Problem Relation Age of Onset    Hypertension Maternal Grandmother          Medications Prior to Admission   Medication Sig Dispense Refill Last Dose/Taking    cyanocobalamin 1000 MCG/ML injection Inject 1 mL into the appropriate muscle as directed by prescriber Every 7 (Seven) Days.   11/13/2024         ALLERGIES:  Patient has no known allergies.    Temp:  [97.1 °F (36.2 °C)-98.2 °F (36.8 °C)] 97.1 °F (36.2 °C)  Heart Rate:  [] 113  Resp:  [18-19] 18  BP: (139-162)/() 145/99    REVIEW OF SYSTEMS:  Review of Systems   Constitutional: Negative.    HENT: Negative.     Eyes: Negative.    Respiratory: Negative.     Cardiovascular: Negative.    Gastrointestinal: Negative.    Endocrine: Negative.    Genitourinary: Negative.    Musculoskeletal: Negative.    Skin: Negative.    Allergic/Immunologic: Negative.    Neurological: Negative.    Hematological: Negative.    Psychiatric/Behavioral: Negative.          OBJECTIVE    PHYSICAL EXAM:  Physical Exam  Constitutional:  Appears well-developed and well-nourished.   HENT:   Head: Normocephalic and atraumatic.   Right Ear: External ear normal.   Left Ear: External ear normal.   Mouth/Throat: Oropharynx is clear and moist.   Eyes: Pupils are equal, round, and reactive to light. Conjunctivae and EOM are normal.   Neck: Normal range of motion. Neck supple.   Cardiovascular: Normal rate, regular rhythm and normal heart sounds.    Respiratory: Effort normal and breath sounds normal. No respiratory distress. No wheezes.   GI: Soft. Bowel sounds are normal.No distension. There is no tenderness.   Musculoskeletal: Normal range of motion. No edema or deformity.   Neurological:  Cranial Nerves: I. No anosmia. II: No visual disturbance. III, IV VI: EOMI, PERRLA. V: Corneal reflext intact, no abnormal sensations. VII: No facial palsy, or altered sensation. VIII: Hearing intact, balance intact. IX: Intact ah reflex. X:  Normal phonation, swallowing. XI: Normal shrug and head movement. XII: Intact tongue movements  Coordination normal. No lateralizing signs.  Skin: Skin is warm and dry. No rash noted. No erythema.     MENTAL STATUS EXAM:   Hygiene:   poor  Cooperation:  Cooperative  Eye Contact:  Fair  Psychomotor Behavior:  Appropriate  Affect:  Appropriate  Hopelessness: Denies  Speech:  Normal  Thought Progress: Goal directed  Thought Content:  Normal  Suicidal:  None  Homicidal:  None  Hallucinations:  None  Delusion:  None  Memory:  Intact  Orientation:  Person, Place, Time, and Situation  Reliability:  fair  Insight:  Fair  Judgement:  Fair  Impulse Control:  Poor    Imaging Results (Last 24 Hours)       ** No results found for the last 24 hours. **             ECG/EMG Results (most recent)       Procedure Component Value Units Date/Time    ECG 12 Lead Other; Baseline Cardiac Status [[id number]] Collected: 11/21/24 0511     Updated: 11/21/24 0512     QT Interval 364 ms      QTC Interval 478 ms     Narrative:      Test Reason : Other~  Blood Pressure :   */*   mmHG  Vent. Rate : 104 BPM     Atrial Rate : 104 BPM     P-R Int : 122 ms          QRS Dur :  84 ms      QT Int : 364 ms       P-R-T Axes :  39  18 -10 degrees    QTcB Int : 478 ms    Sinus tachycardia  Nonspecific T wave abnormality  Abnormal ECG  When compared with ECG of 09-Nov-2018 20:11,  T wave inversion more evident in Inferior leads  Nonspecific T wave abnormality now evident in Lateral leads    Referred By:            Confirmed By:              Lab Results   Component Value Date    GLUCOSE 102 (H) 11/20/2024    BUN 12 11/20/2024    CREATININE 0.94 11/20/2024    EGFRIFNONA 94 11/09/2018    BCR 12.8 11/20/2024    CO2 20.1 (L) 11/20/2024    CALCIUM 9.4 11/20/2024    ALBUMIN 3.9 11/20/2024    AST 24 11/20/2024    ALT 19 11/20/2024       Lab Results   Component Value Date    WBC 9.37 11/20/2024    HGB 13.1 11/20/2024    HCT 42.6 11/20/2024    MCV 90.1 11/20/2024    PLT  207 11/20/2024       Last Urine Toxicity  More data exists         Latest Ref Rng & Units 11/20/2024 11/9/2018   LAST URINE TOXICITY RESULTS   Amphetamine, Urine Qual Negative Negative  Negative    Barbiturates Screen, Urine Negative Negative  Negative    Benzodiazepine Screen, Urine Negative Negative  Negative    Buprenorphine, Screen, Urine Negative Negative  Negative    Cocaine Screen, Urine Negative Negative  Negative    Fentanyl, Urine Negative Negative  -   Methadone Screen , Urine Negative Negative  Negative    Methamphetamine, Ur Negative Negative  -      Details                   Brief Urine Lab Results  (Last result in the past 365 days)        Color   Clarity   Blood   Leuk Est   Nitrite   Protein   CREAT   Urine HCG        11/20/24 2248 Yellow   Clear   Negative   Negative   Negative   Negative                   DATA  Labs reviewed. CMP, CBC, UA and UDS are unremarkable.   EKG reviewed. QTc interval 478  EDGARDO reviewed.   Record reviewed. The patient was last here in Nov 2018 with a similar presentation.    Strengths: Good family support    Weaknesses:Learning challenges and Poor coping skills    Code status:  Full  Discussed code status with patient.    ASSESSMENT & PLAN:    Hospital bed: No      Impulse control disorder  -Patient got upset with her mother and threatened self harm. She has calmed down now and realizes that her actions were not appropriate. Will continue with supportive treatment and not start any medication at this time.      Intellectual disability  -Contributing to maladaptive behaviors. Continue supportive treatment and consistent rules and consequeces      The patient has been admitted for safety and stabilization.  Patient will be monitored for suicidality daily and maintained on Special Precautions Level 3 (q15 min checks) .  The patient will have individual and group therapy with a master's level therapist. A master treatment plan will be developed and agreed upon by the patient  and his/her treatment team.  The patient's estimated length of stay in the hospital is 5-7 days.

## 2024-11-21 NOTE — NURSING NOTE
Mother(Queta Khan [phone]) is legal guardian of patient. Court documents of guardianship copied and placed in patients chart.

## 2024-11-21 NOTE — NURSING NOTE
Patient presents to intake after dispute with her mother. Mother states that patient was pinching biting and twisting her arms when patients sister called 911 for help. Patient then stated if she didn't get some help she was going to hurt herself. While assessing patient made the same statement that she needed help or she was going to do something to harm herself.   Patient denies SI, HI and AVH.   Patient denies any drug or alcohol use.   Patient rates depression 2/10 and anxiety 4/10.   Patient tox screen is negative.

## 2024-11-21 NOTE — PLAN OF CARE
Goal Outcome Evaluation:  Plan of Care Reviewed With: patient  Plan of Care Reviewed With: patient  Patient Agreement with Plan of Care: agrees     Progress: improving          Patient rates anxiety 3 and depression 0, denies thoughts of harming self and others, and denies hallucinations.

## 2024-11-22 VITALS
OXYGEN SATURATION: 98 % | SYSTOLIC BLOOD PRESSURE: 144 MMHG | RESPIRATION RATE: 18 BRPM | BODY MASS INDEX: 50.96 KG/M2 | TEMPERATURE: 97.7 F | DIASTOLIC BLOOD PRESSURE: 94 MMHG | HEART RATE: 92 BPM | HEIGHT: 59 IN | WEIGHT: 252.8 LBS

## 2024-11-22 PROCEDURE — 99239 HOSP IP/OBS DSCHRG MGMT >30: CPT | Performed by: PSYCHIATRY & NEUROLOGY

## 2024-11-22 NOTE — DISCHARGE INSTR - APPOINTMENTS
[facility], [facility]  [address], [place] [address]  [phone]    November 27 2024 at 1:00pm with Patrick  You will need to stop by the office on Monday or Tuesday to complete paperwork. The office will not keep appointment if paperwork is not finished before appointment time.

## 2024-11-22 NOTE — PLAN OF CARE
Goal Outcome Evaluation:  Plan of Care Reviewed With: patient  Patient Agreement with Plan of Care: agrees        Outcome Evaluation: Pt reports good appetite and sleep. Denies A/D, SI/HI/AVH

## 2024-11-22 NOTE — PLAN OF CARE
Goal Outcome Evaluation:  Plan of Care Reviewed With: patient  Plan of Care Reviewed With: patient  Patient Agreement with Plan of Care: agrees     Progress: improving     Dr. Linares is discharging patient home today.

## 2024-11-22 NOTE — PLAN OF CARE
Problem: Adult Behavioral Health Plan of Care  Goal: Develops/Participates in Therapeutic [place] to Support Successful Transition  Intervention: Mutually Develop Transition Plan  Recent Flowsheet Documentation  Taken 11/22/2024 1022 by Gayle Harrington LCSW  Transportation Anticipated: family or friend will provide  Transportation Concerns: no car  Current Discharge Risk:   psychiatric illness   cognitively impaired  Concerns to be Addressed:   coping/stress   cognitive/perceptual   mental health   relationship  Readmission Within the Last 30 Days: no previous admission in last 30 days  Patient/Family Anticipated Services at Transition:   mental health services   outpatient care  Patient/Family Anticipates Transition to: home with family  Offered/Gave Vendor List: no      Patient reports she is ready to return home today.  Patient is denying suicidal ideation today and denying homicidal ideation today.  Patient reports decrease in Depression today and decrease in Anxiety today.  Patient successful in identifying healthy coping and protective factors this date.  Patient is future oriented and ready for discharge. Patients mother will transport her home and patient has aftercare with [facility].